# Patient Record
Sex: FEMALE | Race: WHITE | NOT HISPANIC OR LATINO | URBAN - METROPOLITAN AREA
[De-identification: names, ages, dates, MRNs, and addresses within clinical notes are randomized per-mention and may not be internally consistent; named-entity substitution may affect disease eponyms.]

---

## 2017-01-16 ENCOUNTER — COMMUNICATION - HEALTHEAST (OUTPATIENT)
Dept: NEUROLOGY | Facility: CLINIC | Age: 27
End: 2017-01-16

## 2017-01-16 DIAGNOSIS — S06.9XAS MOOD DISORDER AS LATE EFFECT OF TRAUMATIC BRAIN INJURY (H): ICD-10-CM

## 2017-01-16 DIAGNOSIS — F06.30 MOOD DISORDER AS LATE EFFECT OF TRAUMATIC BRAIN INJURY (H): ICD-10-CM

## 2017-02-12 ENCOUNTER — COMMUNICATION - HEALTHEAST (OUTPATIENT)
Dept: NEUROLOGY | Facility: CLINIC | Age: 27
End: 2017-02-12

## 2017-02-12 DIAGNOSIS — F06.30 MOOD DISORDER IN CONDITIONS CLASSIFIED ELSEWHERE: ICD-10-CM

## 2017-02-22 ENCOUNTER — COMMUNICATION - HEALTHEAST (OUTPATIENT)
Dept: NEUROLOGY | Facility: CLINIC | Age: 27
End: 2017-02-22

## 2017-02-27 ENCOUNTER — COMMUNICATION - HEALTHEAST (OUTPATIENT)
Dept: NEUROLOGY | Facility: CLINIC | Age: 27
End: 2017-02-27

## 2017-02-27 DIAGNOSIS — F06.30 MOOD DISORDER IN CONDITIONS CLASSIFIED ELSEWHERE: ICD-10-CM

## 2017-03-06 ENCOUNTER — COMMUNICATION - HEALTHEAST (OUTPATIENT)
Dept: NEUROLOGY | Facility: CLINIC | Age: 27
End: 2017-03-06

## 2017-03-06 DIAGNOSIS — F06.30 MOOD DISORDER AS LATE EFFECT OF TRAUMATIC BRAIN INJURY (H): ICD-10-CM

## 2017-03-06 DIAGNOSIS — S06.9XAS MOOD DISORDER AS LATE EFFECT OF TRAUMATIC BRAIN INJURY (H): ICD-10-CM

## 2017-04-02 ENCOUNTER — COMMUNICATION - HEALTHEAST (OUTPATIENT)
Dept: NEUROLOGY | Facility: CLINIC | Age: 27
End: 2017-04-02

## 2017-04-02 DIAGNOSIS — F06.30 MOOD DISORDER AS LATE EFFECT OF TRAUMATIC BRAIN INJURY (H): ICD-10-CM

## 2017-04-02 DIAGNOSIS — S06.9XAS MOOD DISORDER AS LATE EFFECT OF TRAUMATIC BRAIN INJURY (H): ICD-10-CM

## 2017-04-11 ENCOUNTER — HOSPITAL ENCOUNTER (OUTPATIENT)
Dept: NEUROLOGY | Facility: CLINIC | Age: 27
Setting detail: THERAPIES SERIES
Discharge: STILL ADMITTED OR ADMINISTRATIVE | End: 2017-04-11
Attending: PSYCHIATRY & NEUROLOGY

## 2017-04-11 DIAGNOSIS — S06.9XAS MOOD DISORDER AS LATE EFFECT OF TRAUMATIC BRAIN INJURY (H): ICD-10-CM

## 2017-04-11 DIAGNOSIS — F06.30 MOOD DISORDER AS LATE EFFECT OF TRAUMATIC BRAIN INJURY (H): ICD-10-CM

## 2017-04-11 RX ORDER — VARENICLINE TARTRATE 1 MG/1
1 TABLET, FILM COATED ORAL 2 TIMES DAILY
Status: SHIPPED | COMMUNITY
Start: 2017-04-11

## 2017-04-16 ENCOUNTER — COMMUNICATION - HEALTHEAST (OUTPATIENT)
Dept: NEUROLOGY | Facility: CLINIC | Age: 27
End: 2017-04-16

## 2017-04-16 DIAGNOSIS — F06.30 MOOD DISORDER AS LATE EFFECT OF TRAUMATIC BRAIN INJURY (H): ICD-10-CM

## 2017-04-16 DIAGNOSIS — S06.9XAS MOOD DISORDER AS LATE EFFECT OF TRAUMATIC BRAIN INJURY (H): ICD-10-CM

## 2017-04-23 ENCOUNTER — COMMUNICATION - HEALTHEAST (OUTPATIENT)
Dept: NEUROLOGY | Facility: CLINIC | Age: 27
End: 2017-04-23

## 2017-04-23 DIAGNOSIS — S06.9XAS MOOD DISORDER AS LATE EFFECT OF TRAUMATIC BRAIN INJURY (H): ICD-10-CM

## 2017-04-23 DIAGNOSIS — F06.30 MOOD DISORDER IN CONDITIONS CLASSIFIED ELSEWHERE: ICD-10-CM

## 2017-04-23 DIAGNOSIS — F06.30 MOOD DISORDER AS LATE EFFECT OF TRAUMATIC BRAIN INJURY (H): ICD-10-CM

## 2017-05-07 ENCOUNTER — COMMUNICATION - HEALTHEAST (OUTPATIENT)
Dept: NEUROLOGY | Facility: CLINIC | Age: 27
End: 2017-05-07

## 2017-05-07 DIAGNOSIS — F41.1 ANXIETY STATE: ICD-10-CM

## 2017-05-21 ENCOUNTER — COMMUNICATION - HEALTHEAST (OUTPATIENT)
Dept: NEUROLOGY | Facility: CLINIC | Age: 27
End: 2017-05-21

## 2017-05-21 DIAGNOSIS — F06.30 MOOD DISORDER IN CONDITIONS CLASSIFIED ELSEWHERE: ICD-10-CM

## 2017-05-28 ENCOUNTER — COMMUNICATION - HEALTHEAST (OUTPATIENT)
Dept: NEUROLOGY | Facility: CLINIC | Age: 27
End: 2017-05-28

## 2017-05-28 DIAGNOSIS — S06.9XAS MOOD DISORDER AS LATE EFFECT OF TRAUMATIC BRAIN INJURY (H): ICD-10-CM

## 2017-05-28 DIAGNOSIS — F06.30 MOOD DISORDER AS LATE EFFECT OF TRAUMATIC BRAIN INJURY (H): ICD-10-CM

## 2017-06-25 ENCOUNTER — COMMUNICATION - HEALTHEAST (OUTPATIENT)
Dept: NEUROLOGY | Facility: CLINIC | Age: 27
End: 2017-06-25

## 2017-06-25 DIAGNOSIS — F06.30 MOOD DISORDER IN CONDITIONS CLASSIFIED ELSEWHERE: ICD-10-CM

## 2017-07-09 ENCOUNTER — COMMUNICATION - HEALTHEAST (OUTPATIENT)
Dept: NEUROLOGY | Facility: CLINIC | Age: 27
End: 2017-07-09

## 2017-07-09 DIAGNOSIS — S06.9XAS MOOD DISORDER AS LATE EFFECT OF TRAUMATIC BRAIN INJURY (H): ICD-10-CM

## 2017-07-09 DIAGNOSIS — F06.30 MOOD DISORDER IN CONDITIONS CLASSIFIED ELSEWHERE: ICD-10-CM

## 2017-07-09 DIAGNOSIS — F06.30 MOOD DISORDER AS LATE EFFECT OF TRAUMATIC BRAIN INJURY (H): ICD-10-CM

## 2017-07-11 ENCOUNTER — HOSPITAL ENCOUNTER (OUTPATIENT)
Dept: NEUROLOGY | Facility: CLINIC | Age: 27
Setting detail: THERAPIES SERIES
Discharge: STILL ADMITTED OR ADMINISTRATIVE | End: 2017-07-11
Attending: PSYCHIATRY & NEUROLOGY

## 2017-07-11 DIAGNOSIS — F06.30 MOOD DISORDER AS LATE EFFECT OF TRAUMATIC BRAIN INJURY (H): ICD-10-CM

## 2017-07-11 DIAGNOSIS — S06.9XAS MOOD DISORDER AS LATE EFFECT OF TRAUMATIC BRAIN INJURY (H): ICD-10-CM

## 2017-07-23 ENCOUNTER — COMMUNICATION - HEALTHEAST (OUTPATIENT)
Dept: NEUROLOGY | Facility: CLINIC | Age: 27
End: 2017-07-23

## 2017-07-23 DIAGNOSIS — F06.30 MOOD DISORDER IN CONDITIONS CLASSIFIED ELSEWHERE: ICD-10-CM

## 2017-08-06 ENCOUNTER — COMMUNICATION - HEALTHEAST (OUTPATIENT)
Dept: NEUROLOGY | Facility: CLINIC | Age: 27
End: 2017-08-06

## 2017-08-06 DIAGNOSIS — F06.30 MOOD DISORDER AS LATE EFFECT OF TRAUMATIC BRAIN INJURY (H): ICD-10-CM

## 2017-08-06 DIAGNOSIS — S06.9XAS MOOD DISORDER AS LATE EFFECT OF TRAUMATIC BRAIN INJURY (H): ICD-10-CM

## 2017-08-17 ENCOUNTER — COMMUNICATION - HEALTHEAST (OUTPATIENT)
Dept: NEUROLOGY | Facility: CLINIC | Age: 27
End: 2017-08-17

## 2017-08-17 DIAGNOSIS — F06.30 MOOD DISORDER AS LATE EFFECT OF TRAUMATIC BRAIN INJURY (H): ICD-10-CM

## 2017-08-17 DIAGNOSIS — S06.9XAS MOOD DISORDER AS LATE EFFECT OF TRAUMATIC BRAIN INJURY (H): ICD-10-CM

## 2017-08-27 ENCOUNTER — COMMUNICATION - HEALTHEAST (OUTPATIENT)
Dept: NEUROLOGY | Facility: CLINIC | Age: 27
End: 2017-08-27

## 2017-08-27 DIAGNOSIS — F06.30 MOOD DISORDER AS LATE EFFECT OF TRAUMATIC BRAIN INJURY (H): ICD-10-CM

## 2017-08-27 DIAGNOSIS — F06.30 MOOD DISORDER IN CONDITIONS CLASSIFIED ELSEWHERE: ICD-10-CM

## 2017-08-27 DIAGNOSIS — S06.9XAS MOOD DISORDER AS LATE EFFECT OF TRAUMATIC BRAIN INJURY (H): ICD-10-CM

## 2017-09-24 ENCOUNTER — COMMUNICATION - HEALTHEAST (OUTPATIENT)
Dept: NEUROLOGY | Facility: CLINIC | Age: 27
End: 2017-09-24

## 2017-09-24 DIAGNOSIS — F06.30 MOOD DISORDER IN CONDITIONS CLASSIFIED ELSEWHERE: ICD-10-CM

## 2017-10-10 ENCOUNTER — HOSPITAL ENCOUNTER (OUTPATIENT)
Dept: NEUROLOGY | Facility: CLINIC | Age: 27
Setting detail: THERAPIES SERIES
Discharge: STILL ADMITTED OR ADMINISTRATIVE | End: 2017-10-10
Attending: PSYCHIATRY & NEUROLOGY

## 2017-10-10 DIAGNOSIS — F06.30 MOOD DISORDER AS LATE EFFECT OF TRAUMATIC BRAIN INJURY (H): ICD-10-CM

## 2017-10-10 DIAGNOSIS — S06.9XAS MOOD DISORDER AS LATE EFFECT OF TRAUMATIC BRAIN INJURY (H): ICD-10-CM

## 2017-10-13 ENCOUNTER — COMMUNICATION - HEALTHEAST (OUTPATIENT)
Dept: NEUROLOGY | Facility: CLINIC | Age: 27
End: 2017-10-13

## 2017-10-26 ENCOUNTER — COMMUNICATION - HEALTHEAST (OUTPATIENT)
Dept: NEUROLOGY | Facility: CLINIC | Age: 27
End: 2017-10-26

## 2017-10-26 DIAGNOSIS — F06.30 MOOD DISORDER AS LATE EFFECT OF TRAUMATIC BRAIN INJURY (H): ICD-10-CM

## 2017-10-26 DIAGNOSIS — S06.9XAS MOOD DISORDER AS LATE EFFECT OF TRAUMATIC BRAIN INJURY (H): ICD-10-CM

## 2017-10-29 ENCOUNTER — COMMUNICATION - HEALTHEAST (OUTPATIENT)
Dept: NEUROLOGY | Facility: CLINIC | Age: 27
End: 2017-10-29

## 2017-10-29 DIAGNOSIS — F06.30 MOOD DISORDER IN CONDITIONS CLASSIFIED ELSEWHERE: ICD-10-CM

## 2017-11-05 ENCOUNTER — COMMUNICATION - HEALTHEAST (OUTPATIENT)
Dept: NEUROLOGY | Facility: CLINIC | Age: 27
End: 2017-11-05

## 2017-11-05 DIAGNOSIS — S06.9XAS MOOD DISORDER AS LATE EFFECT OF TRAUMATIC BRAIN INJURY (H): ICD-10-CM

## 2017-11-05 DIAGNOSIS — F06.30 MOOD DISORDER AS LATE EFFECT OF TRAUMATIC BRAIN INJURY (H): ICD-10-CM

## 2017-11-05 DIAGNOSIS — F06.30 MOOD DISORDER IN CONDITIONS CLASSIFIED ELSEWHERE: ICD-10-CM

## 2017-11-16 ENCOUNTER — HOSPITAL ENCOUNTER (OUTPATIENT)
Dept: NEUROLOGY | Facility: CLINIC | Age: 27
Setting detail: THERAPIES SERIES
Discharge: STILL ADMITTED OR ADMINISTRATIVE | End: 2017-11-16
Attending: PSYCHIATRY & NEUROLOGY

## 2017-11-16 DIAGNOSIS — F06.30 MOOD DISORDER IN CONDITIONS CLASSIFIED ELSEWHERE: ICD-10-CM

## 2017-11-26 ENCOUNTER — COMMUNICATION - HEALTHEAST (OUTPATIENT)
Dept: NEUROLOGY | Facility: CLINIC | Age: 27
End: 2017-11-26

## 2017-11-26 DIAGNOSIS — F06.30 MOOD DISORDER IN CONDITIONS CLASSIFIED ELSEWHERE: ICD-10-CM

## 2017-12-01 ENCOUNTER — COMMUNICATION - HEALTHEAST (OUTPATIENT)
Dept: NEUROLOGY | Facility: CLINIC | Age: 27
End: 2017-12-01

## 2017-12-01 DIAGNOSIS — F06.30 MOOD DISORDER AS LATE EFFECT OF TRAUMATIC BRAIN INJURY (H): ICD-10-CM

## 2017-12-01 DIAGNOSIS — F41.1 ANXIETY STATE: ICD-10-CM

## 2017-12-01 DIAGNOSIS — F06.30 MOOD DISORDER IN CONDITIONS CLASSIFIED ELSEWHERE: ICD-10-CM

## 2017-12-01 DIAGNOSIS — S06.9XAS MOOD DISORDER AS LATE EFFECT OF TRAUMATIC BRAIN INJURY (H): ICD-10-CM

## 2017-12-03 ENCOUNTER — COMMUNICATION - HEALTHEAST (OUTPATIENT)
Dept: NEUROLOGY | Facility: CLINIC | Age: 27
End: 2017-12-03

## 2017-12-03 DIAGNOSIS — S06.9XAS MOOD DISORDER AS LATE EFFECT OF TRAUMATIC BRAIN INJURY (H): ICD-10-CM

## 2017-12-03 DIAGNOSIS — F06.30 MOOD DISORDER AS LATE EFFECT OF TRAUMATIC BRAIN INJURY (H): ICD-10-CM

## 2018-01-01 ENCOUNTER — COMMUNICATION - HEALTHEAST (OUTPATIENT)
Dept: NEUROLOGY | Facility: CLINIC | Age: 28
End: 2018-01-01

## 2018-01-01 DIAGNOSIS — F06.30 MOOD DISORDER AS LATE EFFECT OF TRAUMATIC BRAIN INJURY (H): ICD-10-CM

## 2018-01-01 DIAGNOSIS — S06.9XAS MOOD DISORDER AS LATE EFFECT OF TRAUMATIC BRAIN INJURY (H): ICD-10-CM

## 2018-01-01 DIAGNOSIS — F06.30 MOOD DISORDER IN CONDITIONS CLASSIFIED ELSEWHERE: ICD-10-CM

## 2018-01-04 ENCOUNTER — COMMUNICATION - HEALTHEAST (OUTPATIENT)
Dept: NEUROLOGY | Facility: CLINIC | Age: 28
End: 2018-01-04

## 2018-01-04 DIAGNOSIS — S06.9XAS MOOD DISORDER AS LATE EFFECT OF TRAUMATIC BRAIN INJURY (H): ICD-10-CM

## 2018-01-04 DIAGNOSIS — F06.30 MOOD DISORDER AS LATE EFFECT OF TRAUMATIC BRAIN INJURY (H): ICD-10-CM

## 2018-01-05 ENCOUNTER — COMMUNICATION - HEALTHEAST (OUTPATIENT)
Dept: NEUROLOGY | Facility: CLINIC | Age: 28
End: 2018-01-05

## 2018-01-14 ENCOUNTER — COMMUNICATION - HEALTHEAST (OUTPATIENT)
Dept: NEUROLOGY | Facility: CLINIC | Age: 28
End: 2018-01-14

## 2018-01-14 DIAGNOSIS — F06.30 MOOD DISORDER IN CONDITIONS CLASSIFIED ELSEWHERE: ICD-10-CM

## 2018-01-28 ENCOUNTER — COMMUNICATION - HEALTHEAST (OUTPATIENT)
Dept: NEUROLOGY | Facility: CLINIC | Age: 28
End: 2018-01-28

## 2018-01-28 DIAGNOSIS — F06.30 MOOD DISORDER IN CONDITIONS CLASSIFIED ELSEWHERE: ICD-10-CM

## 2018-02-06 ENCOUNTER — HOSPITAL ENCOUNTER (OUTPATIENT)
Dept: NEUROLOGY | Facility: CLINIC | Age: 28
Setting detail: THERAPIES SERIES
Discharge: STILL ADMITTED OR ADMINISTRATIVE | End: 2018-02-06
Attending: PSYCHIATRY & NEUROLOGY

## 2018-02-06 DIAGNOSIS — F06.30 MOOD DISORDER IN CONDITIONS CLASSIFIED ELSEWHERE: ICD-10-CM

## 2018-02-25 ENCOUNTER — COMMUNICATION - HEALTHEAST (OUTPATIENT)
Dept: NEUROLOGY | Facility: CLINIC | Age: 28
End: 2018-02-25

## 2018-02-25 DIAGNOSIS — F06.30 MOOD DISORDER IN CONDITIONS CLASSIFIED ELSEWHERE: ICD-10-CM

## 2018-02-25 DIAGNOSIS — F06.30 MOOD DISORDER AS LATE EFFECT OF TRAUMATIC BRAIN INJURY (H): ICD-10-CM

## 2018-02-25 DIAGNOSIS — S06.9XAS MOOD DISORDER AS LATE EFFECT OF TRAUMATIC BRAIN INJURY (H): ICD-10-CM

## 2018-03-04 ENCOUNTER — COMMUNICATION - HEALTHEAST (OUTPATIENT)
Dept: NEUROLOGY | Facility: CLINIC | Age: 28
End: 2018-03-04

## 2018-03-04 DIAGNOSIS — F06.30 MOOD DISORDER AS LATE EFFECT OF TRAUMATIC BRAIN INJURY (H): ICD-10-CM

## 2018-03-04 DIAGNOSIS — S06.9XAS MOOD DISORDER AS LATE EFFECT OF TRAUMATIC BRAIN INJURY (H): ICD-10-CM

## 2018-03-05 ENCOUNTER — COMMUNICATION - HEALTHEAST (OUTPATIENT)
Dept: NEUROLOGY | Facility: CLINIC | Age: 28
End: 2018-03-05

## 2018-03-22 ENCOUNTER — COMMUNICATION - HEALTHEAST (OUTPATIENT)
Dept: NEUROLOGY | Facility: CLINIC | Age: 28
End: 2018-03-22

## 2018-03-27 ENCOUNTER — HOSPITAL ENCOUNTER (OUTPATIENT)
Dept: NEUROLOGY | Facility: CLINIC | Age: 28
Setting detail: THERAPIES SERIES
Discharge: STILL ADMITTED OR ADMINISTRATIVE | End: 2018-03-27
Attending: PSYCHIATRY & NEUROLOGY

## 2018-03-27 DIAGNOSIS — F41.1 ANXIETY STATE: ICD-10-CM

## 2018-03-27 DIAGNOSIS — F06.30 MOOD DISORDER IN CONDITIONS CLASSIFIED ELSEWHERE: ICD-10-CM

## 2018-03-27 DIAGNOSIS — S06.9XAS MOOD DISORDER AS LATE EFFECT OF TRAUMATIC BRAIN INJURY (H): ICD-10-CM

## 2018-03-27 DIAGNOSIS — F06.30 MOOD DISORDER AS LATE EFFECT OF TRAUMATIC BRAIN INJURY (H): ICD-10-CM

## 2018-05-06 ENCOUNTER — COMMUNICATION - HEALTHEAST (OUTPATIENT)
Dept: NEUROLOGY | Facility: CLINIC | Age: 28
End: 2018-05-06

## 2018-05-06 DIAGNOSIS — S06.9XAS MOOD DISORDER AS LATE EFFECT OF TRAUMATIC BRAIN INJURY (H): ICD-10-CM

## 2018-05-06 DIAGNOSIS — F06.30 MOOD DISORDER AS LATE EFFECT OF TRAUMATIC BRAIN INJURY (H): ICD-10-CM

## 2018-05-07 ENCOUNTER — COMMUNICATION - HEALTHEAST (OUTPATIENT)
Dept: NEUROLOGY | Facility: CLINIC | Age: 28
End: 2018-05-07

## 2018-05-15 ENCOUNTER — HOSPITAL ENCOUNTER (OUTPATIENT)
Dept: NEUROLOGY | Facility: CLINIC | Age: 28
Setting detail: THERAPIES SERIES
Discharge: STILL ADMITTED OR ADMINISTRATIVE | End: 2018-05-15
Attending: PSYCHIATRY & NEUROLOGY

## 2018-05-15 DIAGNOSIS — F06.30 MOOD DISORDER IN CONDITIONS CLASSIFIED ELSEWHERE: ICD-10-CM

## 2018-07-08 ENCOUNTER — COMMUNICATION - HEALTHEAST (OUTPATIENT)
Dept: NEUROLOGY | Facility: CLINIC | Age: 28
End: 2018-07-08

## 2018-07-08 DIAGNOSIS — F06.30 MOOD DISORDER AS LATE EFFECT OF TRAUMATIC BRAIN INJURY (H): ICD-10-CM

## 2018-07-08 DIAGNOSIS — S06.9XAS MOOD DISORDER AS LATE EFFECT OF TRAUMATIC BRAIN INJURY (H): ICD-10-CM

## 2018-07-29 ENCOUNTER — COMMUNICATION - HEALTHEAST (OUTPATIENT)
Dept: NEUROLOGY | Facility: CLINIC | Age: 28
End: 2018-07-29

## 2018-07-29 DIAGNOSIS — F06.30 MOOD DISORDER AS LATE EFFECT OF TRAUMATIC BRAIN INJURY (H): ICD-10-CM

## 2018-07-29 DIAGNOSIS — S06.9XAS MOOD DISORDER AS LATE EFFECT OF TRAUMATIC BRAIN INJURY (H): ICD-10-CM

## 2018-08-05 ENCOUNTER — COMMUNICATION - HEALTHEAST (OUTPATIENT)
Dept: NEUROLOGY | Facility: CLINIC | Age: 28
End: 2018-08-05

## 2018-08-05 DIAGNOSIS — F06.30 MOOD DISORDER IN CONDITIONS CLASSIFIED ELSEWHERE: ICD-10-CM

## 2018-08-14 ENCOUNTER — HOSPITAL ENCOUNTER (OUTPATIENT)
Dept: NEUROLOGY | Facility: CLINIC | Age: 28
Setting detail: THERAPIES SERIES
Discharge: STILL ADMITTED OR ADMINISTRATIVE | End: 2018-08-14
Attending: PSYCHIATRY & NEUROLOGY

## 2018-08-14 DIAGNOSIS — F06.30 MOOD DISORDER AS LATE EFFECT OF TRAUMATIC BRAIN INJURY (H): ICD-10-CM

## 2018-08-14 DIAGNOSIS — S06.9XAS MOOD DISORDER AS LATE EFFECT OF TRAUMATIC BRAIN INJURY (H): ICD-10-CM

## 2018-08-19 ENCOUNTER — COMMUNICATION - HEALTHEAST (OUTPATIENT)
Dept: NEUROLOGY | Facility: CLINIC | Age: 28
End: 2018-08-19

## 2018-08-19 DIAGNOSIS — S06.9XAS MOOD DISORDER AS LATE EFFECT OF TRAUMATIC BRAIN INJURY (H): ICD-10-CM

## 2018-08-19 DIAGNOSIS — F06.30 MOOD DISORDER AS LATE EFFECT OF TRAUMATIC BRAIN INJURY (H): ICD-10-CM

## 2018-09-09 ENCOUNTER — COMMUNICATION - HEALTHEAST (OUTPATIENT)
Dept: NEUROLOGY | Facility: CLINIC | Age: 28
End: 2018-09-09

## 2018-09-09 DIAGNOSIS — F06.30 MOOD DISORDER IN CONDITIONS CLASSIFIED ELSEWHERE: ICD-10-CM

## 2018-09-11 ENCOUNTER — COMMUNICATION - HEALTHEAST (OUTPATIENT)
Dept: NEUROLOGY | Facility: CLINIC | Age: 28
End: 2018-09-11

## 2018-09-11 DIAGNOSIS — F06.30 MOOD DISORDER AS LATE EFFECT OF TRAUMATIC BRAIN INJURY (H): ICD-10-CM

## 2018-09-11 DIAGNOSIS — S06.9XAS MOOD DISORDER AS LATE EFFECT OF TRAUMATIC BRAIN INJURY (H): ICD-10-CM

## 2018-09-27 ENCOUNTER — COMMUNICATION - HEALTHEAST (OUTPATIENT)
Dept: NEUROLOGY | Facility: CLINIC | Age: 28
End: 2018-09-27

## 2018-10-02 ENCOUNTER — COMMUNICATION - HEALTHEAST (OUTPATIENT)
Dept: NEUROLOGY | Facility: CLINIC | Age: 28
End: 2018-10-02

## 2018-10-07 ENCOUNTER — COMMUNICATION - HEALTHEAST (OUTPATIENT)
Dept: NEUROLOGY | Facility: CLINIC | Age: 28
End: 2018-10-07

## 2018-10-07 DIAGNOSIS — F06.30 MOOD DISORDER IN CONDITIONS CLASSIFIED ELSEWHERE: ICD-10-CM

## 2018-10-11 ENCOUNTER — COMMUNICATION - HEALTHEAST (OUTPATIENT)
Dept: NEUROLOGY | Facility: CLINIC | Age: 28
End: 2018-10-11

## 2018-10-11 DIAGNOSIS — F06.30 MOOD DISORDER IN CONDITIONS CLASSIFIED ELSEWHERE: ICD-10-CM

## 2018-11-13 ENCOUNTER — COMMUNICATION - HEALTHEAST (OUTPATIENT)
Dept: NEUROLOGY | Facility: CLINIC | Age: 28
End: 2018-11-13

## 2018-11-13 DIAGNOSIS — F06.30 MOOD DISORDER AS LATE EFFECT OF TRAUMATIC BRAIN INJURY (H): ICD-10-CM

## 2018-11-13 DIAGNOSIS — S06.9XAS MOOD DISORDER AS LATE EFFECT OF TRAUMATIC BRAIN INJURY (H): ICD-10-CM

## 2018-11-15 ENCOUNTER — COMMUNICATION - HEALTHEAST (OUTPATIENT)
Dept: NEUROLOGY | Facility: CLINIC | Age: 28
End: 2018-11-15

## 2018-11-15 DIAGNOSIS — F06.30 MOOD DISORDER AS LATE EFFECT OF TRAUMATIC BRAIN INJURY (H): ICD-10-CM

## 2018-11-15 DIAGNOSIS — S06.9XAS MOOD DISORDER AS LATE EFFECT OF TRAUMATIC BRAIN INJURY (H): ICD-10-CM

## 2018-11-25 ENCOUNTER — COMMUNICATION - HEALTHEAST (OUTPATIENT)
Dept: NEUROLOGY | Facility: CLINIC | Age: 28
End: 2018-11-25

## 2018-11-25 DIAGNOSIS — F06.30 MOOD DISORDER AS LATE EFFECT OF TRAUMATIC BRAIN INJURY (H): ICD-10-CM

## 2018-11-25 DIAGNOSIS — S06.9XAS MOOD DISORDER AS LATE EFFECT OF TRAUMATIC BRAIN INJURY (H): ICD-10-CM

## 2018-12-17 ENCOUNTER — COMMUNICATION - HEALTHEAST (OUTPATIENT)
Dept: NEUROLOGY | Facility: CLINIC | Age: 28
End: 2018-12-17

## 2018-12-17 DIAGNOSIS — F06.30 MOOD DISORDER AS LATE EFFECT OF TRAUMATIC BRAIN INJURY (H): ICD-10-CM

## 2018-12-17 DIAGNOSIS — S06.9XAS MOOD DISORDER AS LATE EFFECT OF TRAUMATIC BRAIN INJURY (H): ICD-10-CM

## 2018-12-23 ENCOUNTER — COMMUNICATION - HEALTHEAST (OUTPATIENT)
Dept: NEUROLOGY | Facility: CLINIC | Age: 28
End: 2018-12-23

## 2018-12-23 DIAGNOSIS — F06.30 MOOD DISORDER AS LATE EFFECT OF TRAUMATIC BRAIN INJURY (H): ICD-10-CM

## 2018-12-23 DIAGNOSIS — S06.9XAS MOOD DISORDER AS LATE EFFECT OF TRAUMATIC BRAIN INJURY (H): ICD-10-CM

## 2018-12-27 ENCOUNTER — HOSPITAL ENCOUNTER (OUTPATIENT)
Dept: NEUROLOGY | Facility: CLINIC | Age: 28
Setting detail: THERAPIES SERIES
Discharge: STILL ADMITTED OR ADMINISTRATIVE | End: 2018-12-27
Attending: PSYCHIATRY & NEUROLOGY

## 2018-12-27 DIAGNOSIS — F06.30 MOOD DISORDER IN CONDITIONS CLASSIFIED ELSEWHERE: ICD-10-CM

## 2019-02-19 ENCOUNTER — COMMUNICATION - HEALTHEAST (OUTPATIENT)
Dept: NEUROLOGY | Facility: CLINIC | Age: 29
End: 2019-02-19

## 2019-02-19 DIAGNOSIS — S06.9XAS MOOD DISORDER AS LATE EFFECT OF TRAUMATIC BRAIN INJURY (H): ICD-10-CM

## 2019-02-19 DIAGNOSIS — F06.30 MOOD DISORDER AS LATE EFFECT OF TRAUMATIC BRAIN INJURY (H): ICD-10-CM

## 2019-02-24 ENCOUNTER — COMMUNICATION - HEALTHEAST (OUTPATIENT)
Dept: NEUROLOGY | Facility: CLINIC | Age: 29
End: 2019-02-24

## 2019-02-24 DIAGNOSIS — F06.30 MOOD DISORDER IN CONDITIONS CLASSIFIED ELSEWHERE: ICD-10-CM

## 2019-03-10 ENCOUNTER — COMMUNICATION - HEALTHEAST (OUTPATIENT)
Dept: NEUROLOGY | Facility: CLINIC | Age: 29
End: 2019-03-10

## 2019-03-10 DIAGNOSIS — S06.9XAS MOOD DISORDER AS LATE EFFECT OF TRAUMATIC BRAIN INJURY (H): ICD-10-CM

## 2019-03-10 DIAGNOSIS — F06.30 MOOD DISORDER AS LATE EFFECT OF TRAUMATIC BRAIN INJURY (H): ICD-10-CM

## 2019-03-17 ENCOUNTER — COMMUNICATION - HEALTHEAST (OUTPATIENT)
Dept: NEUROLOGY | Facility: CLINIC | Age: 29
End: 2019-03-17

## 2019-03-17 DIAGNOSIS — S06.9XAS MOOD DISORDER AS LATE EFFECT OF TRAUMATIC BRAIN INJURY (H): ICD-10-CM

## 2019-03-17 DIAGNOSIS — F06.30 MOOD DISORDER AS LATE EFFECT OF TRAUMATIC BRAIN INJURY (H): ICD-10-CM

## 2019-03-18 ENCOUNTER — COMMUNICATION - HEALTHEAST (OUTPATIENT)
Dept: NEUROLOGY | Facility: CLINIC | Age: 29
End: 2019-03-18

## 2019-03-18 DIAGNOSIS — S06.9XAS MOOD DISORDER AS LATE EFFECT OF TRAUMATIC BRAIN INJURY (H): ICD-10-CM

## 2019-03-18 DIAGNOSIS — F06.30 MOOD DISORDER AS LATE EFFECT OF TRAUMATIC BRAIN INJURY (H): ICD-10-CM

## 2019-04-02 ENCOUNTER — COMMUNICATION - HEALTHEAST (OUTPATIENT)
Dept: NEUROLOGY | Facility: CLINIC | Age: 29
End: 2019-04-02

## 2019-04-25 ENCOUNTER — HOSPITAL ENCOUNTER (OUTPATIENT)
Dept: NEUROLOGY | Facility: CLINIC | Age: 29
Setting detail: THERAPIES SERIES
Discharge: STILL ADMITTED OR ADMINISTRATIVE | End: 2019-04-25
Attending: PSYCHIATRY & NEUROLOGY

## 2019-04-25 DIAGNOSIS — F41.1 ANXIETY STATE: ICD-10-CM

## 2019-04-25 DIAGNOSIS — S06.9XAS MOOD DISORDER AS LATE EFFECT OF TRAUMATIC BRAIN INJURY (H): ICD-10-CM

## 2019-04-25 DIAGNOSIS — F06.30 MOOD DISORDER AS LATE EFFECT OF TRAUMATIC BRAIN INJURY (H): ICD-10-CM

## 2019-04-25 RX ORDER — CYCLOBENZAPRINE HCL 5 MG
TABLET ORAL
Status: SHIPPED | COMMUNITY
Start: 2018-06-22

## 2019-04-25 RX ORDER — NAPROXEN SODIUM 220 MG/1
TABLET ORAL
Status: SHIPPED | COMMUNITY
Start: 2019-01-21

## 2019-04-25 RX ORDER — METHOCARBAMOL 750 MG/1
TABLET, FILM COATED ORAL
Status: SHIPPED | COMMUNITY
Start: 2018-05-01

## 2019-04-25 RX ORDER — FERROUS SULFATE 325(65) MG
TABLET ORAL
Status: SHIPPED | COMMUNITY
Start: 2019-02-17

## 2019-04-25 RX ORDER — LORAZEPAM 0.5 MG/1
0.5 TABLET ORAL
Status: SHIPPED | COMMUNITY
Start: 2012-02-02

## 2019-04-25 RX ORDER — MAGNESIUM OXIDE 400 MG/1
TABLET ORAL
Status: SHIPPED | COMMUNITY
Start: 2018-12-10

## 2019-06-13 ENCOUNTER — HOSPITAL ENCOUNTER (OUTPATIENT)
Dept: NEUROLOGY | Facility: CLINIC | Age: 29
Setting detail: THERAPIES SERIES
Discharge: STILL ADMITTED OR ADMINISTRATIVE | End: 2019-06-13
Attending: PSYCHIATRY & NEUROLOGY

## 2019-06-13 ENCOUNTER — COMMUNICATION - HEALTHEAST (OUTPATIENT)
Dept: NEUROLOGY | Facility: CLINIC | Age: 29
End: 2019-06-13

## 2019-06-13 DIAGNOSIS — F06.30 MOOD DISORDER AS LATE EFFECT OF TRAUMATIC BRAIN INJURY (H): ICD-10-CM

## 2019-06-13 DIAGNOSIS — S06.9XAS MOOD DISORDER AS LATE EFFECT OF TRAUMATIC BRAIN INJURY (H): ICD-10-CM

## 2019-07-07 ENCOUNTER — COMMUNICATION - HEALTHEAST (OUTPATIENT)
Dept: NEUROLOGY | Facility: CLINIC | Age: 29
End: 2019-07-07

## 2019-07-07 DIAGNOSIS — S06.9XAS MOOD DISORDER AS LATE EFFECT OF TRAUMATIC BRAIN INJURY (H): ICD-10-CM

## 2019-07-07 DIAGNOSIS — F06.30 MOOD DISORDER IN CONDITIONS CLASSIFIED ELSEWHERE: ICD-10-CM

## 2019-07-07 DIAGNOSIS — F06.30 MOOD DISORDER AS LATE EFFECT OF TRAUMATIC BRAIN INJURY (H): ICD-10-CM

## 2019-07-08 RX ORDER — TRAZODONE HYDROCHLORIDE 100 MG/1
TABLET ORAL
Qty: 60 TABLET | Refills: 3 | Status: SHIPPED | OUTPATIENT
Start: 2019-07-08

## 2019-07-21 ENCOUNTER — COMMUNICATION - HEALTHEAST (OUTPATIENT)
Dept: NEUROLOGY | Facility: CLINIC | Age: 29
End: 2019-07-21

## 2019-07-21 DIAGNOSIS — S06.9XAS MOOD DISORDER AS LATE EFFECT OF TRAUMATIC BRAIN INJURY (H): ICD-10-CM

## 2019-07-21 DIAGNOSIS — F06.30 MOOD DISORDER AS LATE EFFECT OF TRAUMATIC BRAIN INJURY (H): ICD-10-CM

## 2019-08-04 ENCOUNTER — COMMUNICATION - HEALTHEAST (OUTPATIENT)
Dept: NEUROLOGY | Facility: CLINIC | Age: 29
End: 2019-08-04

## 2019-08-04 DIAGNOSIS — F06.30 MOOD DISORDER AS LATE EFFECT OF TRAUMATIC BRAIN INJURY (H): ICD-10-CM

## 2019-08-04 DIAGNOSIS — F41.1 ANXIETY STATE: ICD-10-CM

## 2019-08-04 DIAGNOSIS — S06.9XAS MOOD DISORDER AS LATE EFFECT OF TRAUMATIC BRAIN INJURY (H): ICD-10-CM

## 2019-08-05 ENCOUNTER — COMMUNICATION - HEALTHEAST (OUTPATIENT)
Dept: NEUROLOGY | Facility: CLINIC | Age: 29
End: 2019-08-05

## 2019-08-05 DIAGNOSIS — F41.1 ANXIETY STATE: ICD-10-CM

## 2019-08-11 ENCOUNTER — COMMUNICATION - HEALTHEAST (OUTPATIENT)
Dept: NEUROLOGY | Facility: CLINIC | Age: 29
End: 2019-08-11

## 2019-08-11 DIAGNOSIS — S06.9XAS MOOD DISORDER AS LATE EFFECT OF TRAUMATIC BRAIN INJURY (H): ICD-10-CM

## 2019-08-11 DIAGNOSIS — F06.30 MOOD DISORDER AS LATE EFFECT OF TRAUMATIC BRAIN INJURY (H): ICD-10-CM

## 2019-08-25 ENCOUNTER — COMMUNICATION - HEALTHEAST (OUTPATIENT)
Dept: NEUROLOGY | Facility: CLINIC | Age: 29
End: 2019-08-25

## 2019-08-25 DIAGNOSIS — S06.9XAS MOOD DISORDER AS LATE EFFECT OF TRAUMATIC BRAIN INJURY (H): ICD-10-CM

## 2019-08-25 DIAGNOSIS — F06.30 MOOD DISORDER AS LATE EFFECT OF TRAUMATIC BRAIN INJURY (H): ICD-10-CM

## 2019-09-04 ENCOUNTER — COMMUNICATION - HEALTHEAST (OUTPATIENT)
Dept: NEUROLOGY | Facility: CLINIC | Age: 29
End: 2019-09-04

## 2019-09-04 DIAGNOSIS — F06.30 MOOD DISORDER AS LATE EFFECT OF TRAUMATIC BRAIN INJURY (H): ICD-10-CM

## 2019-09-04 DIAGNOSIS — S06.9XAS MOOD DISORDER AS LATE EFFECT OF TRAUMATIC BRAIN INJURY (H): ICD-10-CM

## 2019-09-04 DIAGNOSIS — F06.30 MOOD DISORDER IN CONDITIONS CLASSIFIED ELSEWHERE: ICD-10-CM

## 2019-09-04 DIAGNOSIS — F41.1 ANXIETY STATE: ICD-10-CM

## 2019-09-04 RX ORDER — VENLAFAXINE HYDROCHLORIDE 37.5 MG/1
75 CAPSULE, EXTENDED RELEASE ORAL DAILY
Qty: 60 CAPSULE | Refills: 3 | Status: SHIPPED | OUTPATIENT
Start: 2019-09-04

## 2019-09-06 ENCOUNTER — HOSPITAL ENCOUNTER (OUTPATIENT)
Dept: NEUROLOGY | Facility: CLINIC | Age: 29
Setting detail: THERAPIES SERIES
Discharge: STILL ADMITTED OR ADMINISTRATIVE | End: 2019-09-06
Attending: PSYCHIATRY & NEUROLOGY

## 2019-09-15 ENCOUNTER — COMMUNICATION - HEALTHEAST (OUTPATIENT)
Dept: NEUROLOGY | Facility: CLINIC | Age: 29
End: 2019-09-15

## 2019-09-15 DIAGNOSIS — F06.30 MOOD DISORDER AS LATE EFFECT OF TRAUMATIC BRAIN INJURY (H): ICD-10-CM

## 2019-09-15 DIAGNOSIS — S06.9XAS MOOD DISORDER AS LATE EFFECT OF TRAUMATIC BRAIN INJURY (H): ICD-10-CM

## 2019-09-17 ENCOUNTER — HOSPITAL ENCOUNTER (OUTPATIENT)
Dept: NEUROLOGY | Facility: CLINIC | Age: 29
Setting detail: THERAPIES SERIES
Discharge: STILL ADMITTED OR ADMINISTRATIVE | End: 2019-09-17
Attending: PSYCHIATRY & NEUROLOGY

## 2019-09-17 DIAGNOSIS — S06.9XAS MOOD DISORDER AS LATE EFFECT OF TRAUMATIC BRAIN INJURY (H): ICD-10-CM

## 2019-09-17 DIAGNOSIS — F06.30 MOOD DISORDER AS LATE EFFECT OF TRAUMATIC BRAIN INJURY (H): ICD-10-CM

## 2019-09-20 ENCOUNTER — COMMUNICATION - HEALTHEAST (OUTPATIENT)
Dept: NEUROLOGY | Facility: CLINIC | Age: 29
End: 2019-09-20

## 2019-10-07 ENCOUNTER — COMMUNICATION - HEALTHEAST (OUTPATIENT)
Dept: NEUROLOGY | Facility: CLINIC | Age: 29
End: 2019-10-07

## 2019-10-14 ENCOUNTER — COMMUNICATION - HEALTHEAST (OUTPATIENT)
Dept: NEUROLOGY | Facility: CLINIC | Age: 29
End: 2019-10-14

## 2019-10-14 DIAGNOSIS — F06.30 MOOD DISORDER AS LATE EFFECT OF TRAUMATIC BRAIN INJURY (H): ICD-10-CM

## 2019-10-14 DIAGNOSIS — S06.9XAS MOOD DISORDER AS LATE EFFECT OF TRAUMATIC BRAIN INJURY (H): ICD-10-CM

## 2019-10-18 ENCOUNTER — COMMUNICATION - HEALTHEAST (OUTPATIENT)
Dept: NEUROLOGY | Facility: CLINIC | Age: 29
End: 2019-10-18

## 2019-10-20 ENCOUNTER — COMMUNICATION - HEALTHEAST (OUTPATIENT)
Dept: NEUROLOGY | Facility: CLINIC | Age: 29
End: 2019-10-20

## 2019-10-20 DIAGNOSIS — F41.1 ANXIETY STATE: ICD-10-CM

## 2019-10-22 ENCOUNTER — HOSPITAL ENCOUNTER (OUTPATIENT)
Dept: NEUROLOGY | Facility: CLINIC | Age: 29
Setting detail: THERAPIES SERIES
Discharge: STILL ADMITTED OR ADMINISTRATIVE | End: 2019-10-22
Attending: PSYCHIATRY & NEUROLOGY

## 2019-10-22 DIAGNOSIS — S06.9XAS MOOD DISORDER AS LATE EFFECT OF TRAUMATIC BRAIN INJURY (H): ICD-10-CM

## 2019-10-22 DIAGNOSIS — F06.30 MOOD DISORDER AS LATE EFFECT OF TRAUMATIC BRAIN INJURY (H): ICD-10-CM

## 2019-11-06 ENCOUNTER — COMMUNICATION - HEALTHEAST (OUTPATIENT)
Dept: NEUROLOGY | Facility: CLINIC | Age: 29
End: 2019-11-06

## 2019-11-06 DIAGNOSIS — F06.30 MOOD DISORDER IN CONDITIONS CLASSIFIED ELSEWHERE: ICD-10-CM

## 2019-11-08 ENCOUNTER — COMMUNICATION - HEALTHEAST (OUTPATIENT)
Dept: NEUROLOGY | Facility: CLINIC | Age: 29
End: 2019-11-08

## 2019-11-08 DIAGNOSIS — F06.30 MOOD DISORDER IN CONDITIONS CLASSIFIED ELSEWHERE: ICD-10-CM

## 2019-11-09 RX ORDER — ATOMOXETINE 100 MG/1
100 CAPSULE ORAL DAILY
Qty: 30 CAPSULE | Refills: 3 | Status: SHIPPED | OUTPATIENT
Start: 2019-11-09

## 2019-11-17 ENCOUNTER — COMMUNICATION - HEALTHEAST (OUTPATIENT)
Dept: NEUROLOGY | Facility: CLINIC | Age: 29
End: 2019-11-17

## 2019-11-17 DIAGNOSIS — S06.9XAS MOOD DISORDER AS LATE EFFECT OF TRAUMATIC BRAIN INJURY (H): ICD-10-CM

## 2019-11-17 DIAGNOSIS — F06.30 MOOD DISORDER AS LATE EFFECT OF TRAUMATIC BRAIN INJURY (H): ICD-10-CM

## 2019-11-18 ENCOUNTER — COMMUNICATION - HEALTHEAST (OUTPATIENT)
Dept: NEUROLOGY | Facility: CLINIC | Age: 29
End: 2019-11-18

## 2019-11-24 ENCOUNTER — COMMUNICATION - HEALTHEAST (OUTPATIENT)
Dept: NEUROLOGY | Facility: CLINIC | Age: 29
End: 2019-11-24

## 2019-11-24 DIAGNOSIS — F41.1 ANXIETY STATE: ICD-10-CM

## 2019-11-25 ENCOUNTER — COMMUNICATION - HEALTHEAST (OUTPATIENT)
Dept: NEUROLOGY | Facility: CLINIC | Age: 29
End: 2019-11-25

## 2019-11-25 RX ORDER — HYDROXYZINE PAMOATE 25 MG/1
CAPSULE ORAL
Qty: 90 CAPSULE | Refills: 0 | Status: SHIPPED | OUTPATIENT
Start: 2019-11-25

## 2019-12-01 ENCOUNTER — COMMUNICATION - HEALTHEAST (OUTPATIENT)
Dept: NEUROLOGY | Facility: CLINIC | Age: 29
End: 2019-12-01

## 2019-12-01 DIAGNOSIS — S06.9XAS MOOD DISORDER AS LATE EFFECT OF TRAUMATIC BRAIN INJURY (H): ICD-10-CM

## 2019-12-01 DIAGNOSIS — F06.30 MOOD DISORDER AS LATE EFFECT OF TRAUMATIC BRAIN INJURY (H): ICD-10-CM

## 2019-12-05 ENCOUNTER — RECORDS - HEALTHEAST (OUTPATIENT)
Dept: ADMINISTRATIVE | Facility: OTHER | Age: 29
End: 2019-12-05

## 2019-12-10 ENCOUNTER — HOSPITAL ENCOUNTER (OUTPATIENT)
Dept: NEUROLOGY | Facility: CLINIC | Age: 29
Setting detail: THERAPIES SERIES
Discharge: STILL ADMITTED OR ADMINISTRATIVE | End: 2019-12-10
Attending: PSYCHIATRY & NEUROLOGY

## 2019-12-10 DIAGNOSIS — F06.30 MOOD DISORDER AS LATE EFFECT OF TRAUMATIC BRAIN INJURY (H): ICD-10-CM

## 2019-12-10 DIAGNOSIS — S06.9XAS MOOD DISORDER AS LATE EFFECT OF TRAUMATIC BRAIN INJURY (H): ICD-10-CM

## 2019-12-15 ENCOUNTER — COMMUNICATION - HEALTHEAST (OUTPATIENT)
Dept: NEUROLOGY | Facility: CLINIC | Age: 29
End: 2019-12-15

## 2019-12-15 DIAGNOSIS — S06.9XAS MOOD DISORDER AS LATE EFFECT OF TRAUMATIC BRAIN INJURY (H): ICD-10-CM

## 2019-12-15 DIAGNOSIS — F06.30 MOOD DISORDER AS LATE EFFECT OF TRAUMATIC BRAIN INJURY (H): ICD-10-CM

## 2019-12-16 ENCOUNTER — COMMUNICATION - HEALTHEAST (OUTPATIENT)
Dept: NEUROLOGY | Facility: CLINIC | Age: 29
End: 2019-12-16

## 2019-12-16 DIAGNOSIS — F06.30 MOOD DISORDER AS LATE EFFECT OF TRAUMATIC BRAIN INJURY (H): ICD-10-CM

## 2019-12-16 DIAGNOSIS — S06.9XAS MOOD DISORDER AS LATE EFFECT OF TRAUMATIC BRAIN INJURY (H): ICD-10-CM

## 2019-12-18 RX ORDER — CLONAZEPAM 0.5 MG/1
TABLET ORAL
Qty: 60 TABLET | Refills: 0 | Status: SHIPPED | OUTPATIENT
Start: 2019-12-18

## 2019-12-18 RX ORDER — VENLAFAXINE HYDROCHLORIDE 150 MG/1
CAPSULE, EXTENDED RELEASE ORAL
Qty: 60 CAPSULE | Refills: 3 | Status: SHIPPED | OUTPATIENT
Start: 2019-12-18

## 2019-12-31 ENCOUNTER — COMMUNICATION - HEALTHEAST (OUTPATIENT)
Dept: NEUROLOGY | Facility: CLINIC | Age: 29
End: 2019-12-31

## 2020-01-05 ENCOUNTER — COMMUNICATION - HEALTHEAST (OUTPATIENT)
Dept: NEUROLOGY | Facility: CLINIC | Age: 30
End: 2020-01-05

## 2020-01-05 DIAGNOSIS — F41.1 ANXIETY STATE: ICD-10-CM

## 2020-02-02 ENCOUNTER — COMMUNICATION - HEALTHEAST (OUTPATIENT)
Dept: NEUROLOGY | Facility: CLINIC | Age: 30
End: 2020-02-02

## 2020-02-02 DIAGNOSIS — S06.9XAS MOOD DISORDER AS LATE EFFECT OF TRAUMATIC BRAIN INJURY (H): ICD-10-CM

## 2020-02-02 DIAGNOSIS — F06.30 MOOD DISORDER AS LATE EFFECT OF TRAUMATIC BRAIN INJURY (H): ICD-10-CM

## 2020-02-03 RX ORDER — TOPIRAMATE 25 MG/1
TABLET, FILM COATED ORAL
Qty: 60 TABLET | Refills: 1 | Status: SHIPPED | OUTPATIENT
Start: 2020-02-03

## 2020-03-15 ENCOUNTER — COMMUNICATION - HEALTHEAST (OUTPATIENT)
Dept: NEUROLOGY | Facility: CLINIC | Age: 30
End: 2020-03-15

## 2020-03-15 DIAGNOSIS — S06.9XAS MOOD DISORDER AS LATE EFFECT OF TRAUMATIC BRAIN INJURY (H): ICD-10-CM

## 2020-03-15 DIAGNOSIS — F06.30 MOOD DISORDER AS LATE EFFECT OF TRAUMATIC BRAIN INJURY (H): ICD-10-CM

## 2020-03-29 ENCOUNTER — COMMUNICATION - HEALTHEAST (OUTPATIENT)
Dept: NEUROLOGY | Facility: CLINIC | Age: 30
End: 2020-03-29

## 2020-03-29 DIAGNOSIS — F06.30 MOOD DISORDER AS LATE EFFECT OF TRAUMATIC BRAIN INJURY (H): ICD-10-CM

## 2020-03-29 DIAGNOSIS — S06.9XAS MOOD DISORDER AS LATE EFFECT OF TRAUMATIC BRAIN INJURY (H): ICD-10-CM

## 2020-05-10 ENCOUNTER — COMMUNICATION - HEALTHEAST (OUTPATIENT)
Dept: NEUROLOGY | Facility: CLINIC | Age: 30
End: 2020-05-10

## 2020-05-10 DIAGNOSIS — F06.30 MOOD DISORDER AS LATE EFFECT OF TRAUMATIC BRAIN INJURY (H): ICD-10-CM

## 2020-05-10 DIAGNOSIS — S06.9XAS MOOD DISORDER AS LATE EFFECT OF TRAUMATIC BRAIN INJURY (H): ICD-10-CM

## 2020-08-23 ENCOUNTER — COMMUNICATION - HEALTHEAST (OUTPATIENT)
Dept: NEUROLOGY | Facility: CLINIC | Age: 30
End: 2020-08-23

## 2020-08-23 DIAGNOSIS — S06.9XAS MOOD DISORDER AS LATE EFFECT OF TRAUMATIC BRAIN INJURY (H): ICD-10-CM

## 2020-08-23 DIAGNOSIS — F06.30 MOOD DISORDER AS LATE EFFECT OF TRAUMATIC BRAIN INJURY (H): ICD-10-CM

## 2020-10-18 ENCOUNTER — COMMUNICATION - HEALTHEAST (OUTPATIENT)
Dept: NEUROLOGY | Facility: CLINIC | Age: 30
End: 2020-10-18

## 2020-10-18 DIAGNOSIS — F06.30 MOOD DISORDER AS LATE EFFECT OF TRAUMATIC BRAIN INJURY (H): ICD-10-CM

## 2020-10-18 DIAGNOSIS — S06.9XAS MOOD DISORDER AS LATE EFFECT OF TRAUMATIC BRAIN INJURY (H): ICD-10-CM

## 2020-10-18 RX ORDER — TRAZODONE HYDROCHLORIDE 100 MG/1
TABLET ORAL
Qty: 60 TABLET | Refills: 3 | Status: SHIPPED | OUTPATIENT
Start: 2020-10-18

## 2021-01-24 ENCOUNTER — COMMUNICATION - HEALTHEAST (OUTPATIENT)
Dept: NEUROLOGY | Facility: CLINIC | Age: 31
End: 2021-01-24

## 2021-01-24 DIAGNOSIS — F06.30 MOOD DISORDER AS LATE EFFECT OF TRAUMATIC BRAIN INJURY (H): ICD-10-CM

## 2021-01-24 DIAGNOSIS — S06.9XAS MOOD DISORDER AS LATE EFFECT OF TRAUMATIC BRAIN INJURY (H): ICD-10-CM

## 2021-01-25 RX ORDER — LAMOTRIGINE 25 MG/1
TABLET ORAL
Qty: 180 TABLET | Refills: 3 | Status: SHIPPED | OUTPATIENT
Start: 2021-01-25

## 2021-05-27 NOTE — TELEPHONE ENCOUNTER
She has been on a lot of different medications which have been minimally helpful.  Let us see how she does after meeting with the therapist.  I agree with the plan to utilize the ER as necessary as well.

## 2021-05-27 NOTE — TELEPHONE ENCOUNTER
"Edith called today in regards to having increase in depression and suicidal ideation rating of \"7\". She is having a lot of situational stresses from moving and having to change things over. She is with her boy friend now and that helps her. She has reached out to her therapist and will go to ER if necessary. She is requesting a change/addition to her mediation before her next appt on 4/25/19. She has been using the prn vistaril but it has not been that helpful, especially when she has a \"meltdown\". Please advise.   "

## 2021-05-27 NOTE — TELEPHONE ENCOUNTER
This RN attempted to call Edith twice today (once in the morning and once at this time ). Edith's phone went right to her voice mail both times. Left messages for her to call us back. Will await her return call.

## 2021-05-27 NOTE — TELEPHONE ENCOUNTER
Edith called back today and was pretty upset that no one had called her back and cared about her being this depressed. Relayed message from Dr. Hernández and she said she was going to therapy on Monday.  She will call us after to let us know what they said and how she feels.  She is still pretty down.

## 2021-05-28 NOTE — PROGRESS NOTES
Patient's impression of how medication is working? No    Compliant with Medication? Yes    Side Effects: Depression, anxiety, suicidal    Current Symptoms : Yes, depression    Pain (0-10) Yes, Headaches/Neck pain 5/10  Appetite change No  Sleep disturbance No, night sweats  Change in energy Yes, tired and isolated.   Change in interest Yes, lack of interest  Change in concentration No  Psychosis/Hallucinations No  Negative thoughts Yes  Mood swings Yes  Alcohol use Yes  Drug use Yes, marijuana once within the last 2 weeks.  Anxiety high  Sad/depressed mood high     Rock Oliveira TONJA 04/25/19 3925

## 2021-05-28 NOTE — PROGRESS NOTES
.  Outpatient Followup Psychiatric Evaluation      Pertinent History: Patient presents today for the purposes of medication management.  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  She had a possible seizure.  She required a  shunt.  She had cerebral edema with a 7 mm midline shift and a right parietal-occipital focal contusion.  She also had traumatic subarachnoid blood and a small subcortical parenchymal contusion in the left frontal bone.  In addition she had a frontal bone fracture anteriorly.  Please see the chart for full details.  The patient has been resistive to most medication therapies with no significant improvement.  In the past we had attempted to taper off Topamax but she had a decline in her mood and that was restarted.  She had been involved in DBT groups which she reported was helpful.  We had been tapering the Lamictal in the spring 2018 and the patient had a decline in mood.     I saw the patient in August 2018 and at that time we did increase the Topamax slightly.  Since that visit, over the phone we did decrease the Topamax back down and increase Strattera.    I saw the patient in December 2018.  At that time she told me she was doing fairly well but was struggling with some back pain issues.  We did not make any medication changes at that time.  Since that visit the patient has called the clinic stating she has been more depressed.    Current Symptoms:   She came in today quite irritable and agitated stating she did not receive calls back from the clinic when she was in distress.  There is evidence in the chart that there were frequent attempts made to call her but there was no answer.  In any case she agreed that perhaps she did not receive the calls because something on her and.  She states that she sought out help from her mother who provided support.  She stated she had to use the  "\"\"word\" to let her mother know that she was in a serious situation.    She tells me that she did graduate from her DBT program and was told to use \"the skills she learned\" when she was in situations like that.  She stated that was the advice she got when she sought out help from them.  She is getting a new therapist that she had prior to her brain injury and is comfortable with that and feels that nobody is \"fixing me\".  When asked what I could do she was quite agitated and irritable and stated I did not spend enough time with her or care about her and that my job was to fix her and I was not doing it.  We did get her mother on the phone who seemed well aware of the patient's recent situation and her struggles.  The patient has quite a bit more stress in her life as she is moved up to [facility] to live with her boyfriend.  There continues to be financial concerns.  She reports that things are very difficult because she is changing all her community services to be part of that [facility].  Despite all this she states she is no longer acutely suicidal and that has improved.    She continues to struggle with mood instability irritability and  Chronic fleeting thoughts of wishing she was dead.  This is all unchanged.  No psychosis.  She reports no new medical issues and denies side effects to the medication.    We did spend quite a bit of time talking about treatment options.  The patient's mother stated she never been on Zoloft and wondered whether that would be helpful.  I suggested that I had some concerns about changing from the Effexor that had been a very good medication for her due to recent situationally difficult stressors that may have contributed to her current presentation.  The patient agreed that she did not want to get off the Effexor.  We discussed a variety of options.  Apparently the patient has not been utilizing the PRN Vistaril other than a single 25 mg dose a day.  We did talk about utilizing " that to 3 times a day and increasing the dose to 25-50.  She was in agreement with that.  We also decided to increase the Topamax to 50 mg twice a day.  The patient appeared comfortable with this as did the patient's mother.  Risks and benefits were discussed.  Patient will continue to utilize emergency services as she is well versed with.        Current Medications: Please see chart. Medications personally reviewed.    Medication Compliance: yes    Side Effects to Medications:  No obvious side effects.      Vitals:  Wt Readings from Last 3 Encounters:   No data found for Wt     Temp Readings from Last 3 Encounters:   No data found for Temp     BP Readings from Last 3 Encounters:   No data found for BP     Pulse Readings from Last 3 Encounters:   No data found for Pulse         Mental Status Exam:    Appearance: Patient appeared frustrated irritable and angry.  She avoided eye contact.  She sat with her arms crossed.  No significant pain and no shortness of breath.  The patient however appears quite slow and flat.  Behavior: Patient was irritable and argumentative.  Frequently swearing.  Speech: Frequent swearing with pressured speech.  Not thick or slurred.  Sentence structure was intact.  She was able to initiate.  Mood/Affect: Depressed and irritable.  A bit more labile today.  Thought Content:  No evidence of psychosis. No recent reported psychosis.  Suicidal or Homicidal Thoughts: She had some more intense suicidal thoughts a couple of weeks ago but that has improved.  She states that she is at her baseline currently and has no plan or desire to harm herself or anybody else.  Thought Process/Formulation: Perhaps with some pressured thoughts.  She is able to track and follow conversation.  Associations: Slow.  Not loose.  No racing thoughts.  Fund of Knowledge: Able to participate a bit.  Somewhat limited effort however.  No apparent recent change.  Attention/Concentration: She is a bit perseverative but is able  to track and follow.  Concentration appears a bit impaired.  Insight: Perhaps a bit more impaired.  Judgement: No apparent recent change.    Memory:   Slow.  A bit slow with limited participation.  Motor Status:   No current tremor.  No reports of any recent change.  Orientation: No reports of any recent change.  Grossly unchanged.    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  I have changed that to 25 to 50 mg 3 times a day as needed.  We have also increased the patient's Topamax to 50 mg twice a day.    Other:   Patient will return to clinic in 6 weeks for medication check.  She agrees to call or return sooner with any questions, concerns or problems.  We will continue with her individual therapist.  We will continue with follow-up as per her prior DBT recommendations.    Continue with the support of the clinic, reassurance, and redirection. Staff monitoring and ongoing assessments per team plan. Current psychotropic medication appears to represent the minimum effective dosage and appears medically necessary. We will continue to monitor and reassess. This team will utilize appropriate emergency services if necessary. I will make myself available if concerns or problems arise.    Danilo Hernández MD

## 2021-05-29 NOTE — PROGRESS NOTES
.  Outpatient Followup Psychiatric Evaluation      Pertinent History: Patient presents today for the purposes of medication management.  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  She had a possible seizure.  She required a  shunt.  She had cerebral edema with a 7 mm midline shift and a right parietal-occipital focal contusion.  She also had traumatic subarachnoid blood and a small subcortical parenchymal contusion in the left frontal bone.  In addition she had a frontal bone fracture anteriorly.  Please see the chart for full details.  The patient has been resistive to most medication therapies with no significant improvement.  In the past we had attempted to taper off Topamax but she had a decline in her mood and that was restarted.  She had been involved in DBT groups which she reported was helpful.  We had been tapering the Lamictal in the spring 2018 and the patient had a decline in mood.     I saw the patient in August 2018 and at that time we did increase the Topamax slightly.  Since that visit, over the phone we did decrease the Topamax back down and increase Strattera.    I saw the patient in December 2018.  At that time she told me she was doing fairly well but was struggling with some back pain issues.  We did not make any medication changes at that time.  Since that visit the patient has called the clinic stating she has been more depressed.    I saw the patient in April 2019.  The patient was reporting that she was more anxious and having more difficulty with mood.  She was not utilizing the Vistaril all PRN and I did encourage her to do that.  Also we increased the patient's Topamax at that time.    Current Symptoms:   Patient presents today stating that her main concern is ongoing neck and back pain.  She states it has been excruciating for quite some time.  She went to the emergency room on  Monday where they gave her IV Norco as well as steroids.  She states that she continues to be in pain and she is following up with the pain clinic soon.  She states because of this her mood is not very good and she is tired all the time.  She admits to being sad.  She has her baseline periodic suicidal ideation but no specific plan and she states she is utilizing her DBT techniques and that is working.  She also finds her mother to be a significant source of help.  She is able to contract for safety and states she will not harm herself.    She denies having any psychosis.  She states sleep is difficult due to pain.  She states she continues with periodic anxiety but often forgets to take her Vistaril.  She states when she does take it is helpful.  She denies having any other new medical issues or diagnoses and denies side effects to the medication.  We discussed a variety of options available and she would like to increase the Topamax further.  Risks and benefits were discussed.        Current Medications: Please see chart. Medications personally reviewed.    Medication Compliance: yes    Side Effects to Medications:  No obvious side effects.      Vitals:  Wt Readings from Last 3 Encounters:   No data found for Wt     Temp Readings from Last 3 Encounters:   No data found for Temp     BP Readings from Last 3 Encounters:   No data found for BP     Pulse Readings from Last 3 Encounters:   No data found for Pulse         Mental Status Exam:    Appearance:  Patient appears slow flat and depressed.  Limited eye contact. Limited effort. No obvious shortness of breath. No obvious pain at this time.  Behavior: He stares at the floor the entire interview.  Limited movement. Not agitated. No restlessness.  Speech: Slow monotone and vague but she is able to dialogue and initiate.  Not pressured or rambling.  Soft-spoken.  Mood/Affect:  Flat, slow, depressed. No current anxiety or agitation. Not currently labile.  Thought  Content:  No evidence of psychosis. No recent reported psychosis.  Suicidal or Homicidal Thoughts:  None apparent or reported currently.  She does have fleeting thoughts of wishing she was dead periodically but this is baseline.  She states she will not act on this.  She states she is utilizing her DBT techniques effectively..   Thought Process/Formulation:  Slow. [place]. Limited effort. Limited interest. No evidence of any racing thoughts.  Associations:  Difficult to assess due to limited participation.  No obvious loosening of associations.  Slow. [place].  Fund of Knowledge: Appears adequate.  No reports of any significant recent change.  Attention/Concentration: Flat and slow.  She does need prompts and structure but she is able to follow and participate.  Insight: Fair.  Baseline.  Judgement: Adequate.  Baseline.  Memory:  Limited participation. Slow.  No obvious change.  Motor Status: Slow and flat.  Hypokinetic.  No current tremor.  Orientation: No reports of any recent change.  Grossly oriented.    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    Other:   Patient will return to clinic in 3 months for medication check.  She will follow-up with her medical doctors including the intake at the pain clinic regarding her pain.  She agrees to call or return sooner with any questions, concerns or problems.  We will continue with her individual therapist.  We will continue with follow-up as per her prior DBT recommendations.    Continue with the support of the clinic, reassurance, and redirection. Staff monitoring and ongoing assessments per team plan. Current psychotropic medication appears to represent the minimum effective dosage and appears medically necessary. We  will continue to monitor and reassess. This team will utilize appropriate emergency services if necessary. I will make myself available if concerns or problems arise.    Danilo Hernández MD

## 2021-06-01 NOTE — PROGRESS NOTES
".OUT PATIENT-CLINICAL SOCIAL WORK PROGRESS NOTE      9/6/2019           Edith Valenzuela is a 28 y.o. female who was seen in clinic by Dr. Hernández.      ASSESSMENT: Social work was alerted by SUASNNE Vickers that patient left a concerning message on the nursing line.  Dr. Hernández, patient's psychiatrist was alerted to message and requested a welfare check be called for patient. Patient left a voicemail on nursing line on Thursday 9/5 at approximately 1539 hrs.  The message is as follows: \"This is fucking bullshit about the venlafaxine. [employer] doesn't want to give me the two pills of 37.5 MG, so just fucking bump me up to 100 or 50.  I just dont fucking know, why don't I just fucking off myself.  This is all just too much!  I just can't.This is just so fucked up I need to call my mom.  Sitting at the [facility] [employer] pharmacy havent even been home.  That's fucked up. How about I dont know, no solution!\"  Pt then ended the message.     PLAN: Social work contacted [facility]'s department at [phone] and requested a welfare check on patient.  [facility]'s department will provide a welfare check for patient and activate any services necessary.    Update 09/09/19: SW followed up with [facility]'s department at [phone], regarding patient's status.  [employer] conducted a welfare check,  patient stated she had a difficult day on Thursday. She explained her medical situation and stated she was having a bad day and was much better on Friday.  No further interventions needed at this time.     Type of Service: Phone Call    Services Provided: Welfare Check Requested    Resources Provided: [facility] Department contacted to provide a Welfare Check on pt.           "

## 2021-06-01 NOTE — TELEPHONE ENCOUNTER
"\"This is fucking bullshit about the venlafaxine. [employer] doesn't want to give me the two pills of 37.5 MG, so just fucking bump me up to 100 or 50.  I just dont fucking know, why don't I just fucking off myself.  This is all just too much!  I just can't.This is just so fucked up I need to call my mom.  Sitting at the [facility] pharmacy havent even been home.  That's fucked up. How about I dont know, no solution!\"  Pt then ended the message.  I have reached out to our SW and Dr. Hernández and our clinic will be contacting pt to check on how she is doing.  I have been talking with the pharmacy the last few days with there being a shortage in venlafaxine of 75 MG her original dosage and they needed to change it to 2 37.5 MG tablets which her insurance needs a PA done which has been filled out and faxed back but still hasn't been approved.   "

## 2021-06-01 NOTE — PROGRESS NOTES
.  Outpatient Followup Psychiatric Evaluation      Pertinent History: Patient presents today for the purposes of medication management.  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  She had a possible seizure.  She required a  shunt.  She had cerebral edema with a 7 mm midline shift and a right parietal-occipital focal contusion.  She also had traumatic subarachnoid blood and a small subcortical parenchymal contusion in the left frontal bone.  In addition she had a frontal bone fracture anteriorly.  Please see the chart for full details.  The patient has been resistive to most medication therapies with no significant improvement.  In the past we had attempted to taper off Topamax but she had a decline in her mood and that was restarted.  She had been involved in DBT groups which she reported was helpful.  We had been tapering the Lamictal in the spring 2018 and the patient had a decline in mood.     I saw the patient in August 2018 and at that time we did increase the Topamax slightly.  Since that visit, over the phone we did decrease the Topamax back down and increase Strattera.    I saw the patient in December 2018.  At that time she told me she was doing fairly well but was struggling with some back pain issues.  We did not make any medication changes at that time.  Since that visit the patient has called the clinic stating she has been more depressed.    I saw the patient in April 2019.  The patient was reporting that she was more anxious and having more difficulty with mood.  She was not utilizing the Vistaril all PRN and I did encourage her to do that.  Also we increased the patient's Topamax at that time.    I saw the patient in June 2019.  She was having ongoing pain issues at that time.  She was utilizing the emergency room for IV Narco as well as steroids.  Her mood was not very good she stated  "based on that.  She had been utilizing her DBT techniques which were working.  She requests the possibility of an increase in her Topamax.  We did increase that to 75 mg twice a day.  Patient has called the clinic since the last visit extremely upset and swearing because of difficulty getting the size Effexor that she wanted.  Please see the chart for details.    Current Symptoms:   The patient again was angry confrontational and frequently swearing.  She again was defensive and avoidant and fairly dramatic.  She stated she has been feeling \"shitty\" for 4 to maybe 3 weeks for no particular reason.  She denies having any new stressors or changes in her environment.  She is unsure whether this may be related to the recent increase in Topamax.  He stated she was having trouble with sleep.  She stated she wants to sleep 9 hours a night and has been having a hard time doing that.  She occasionally takes some naps.  She was reluctant to discuss sleep hygiene with me and became quite irritable.  She denies having any hallucinations or delusions.  She reports she is having more frequent suicidal ideation but states she will not act on this.  She then went on a fairly long angry outburst about the clinic calling for a check on the welfare the last time she called in threatening to \"end it all\".  She then stated she wanted her old nurse that worked here back and was swearing and yelling at me about that.  She accused the clinic of being incompetent and not caring.  She however denied that she was actively suicidal and did not want to be in the hospital.  She states this is how she chronically is.  She tells me she is using the Vistaril 2-3 times a day and it is helpful.  She challenged me to make her better but was resistant to suggestions.  I did talk with her about the possibility of adding some low-dose Remeron.  After we discussed the risks and benefits she told me that if there was \"even a chance\" that would make her " "gain weight she did not want to use it.  I confronted her about how bad she was feeling and the fact that we could always stop the medication in the future.  She did eventually allow me to prescribe the medication.  I also talked with her about possibly reducing the Topamax as that apparently was not helpful.  She agreed to do that.  I also expressed to her my desire to try tapering other medications in the future if we could.  She was willing to do that.  She was able to contract for safety.        Current Medications: Please see chart. Medications personally reviewed.    Medication Compliance: yes    Side Effects to Medications:  No obvious side effects.      Vitals:  Wt Readings from Last 3 Encounters:   No data found for Wt     Temp Readings from Last 3 Encounters:   No data found for Temp     BP Readings from Last 3 Encounters:   No data found for BP     Pulse Readings from Last 3 Encounters:   No data found for Pulse         Mental Status Exam:    Appearance: Flat and slow.  Patient avoided eye contact.  She refused to sit in the pace waiting for her appointment and needed a separate room so she would not have to \"look at other people\".  No obvious pain.  Limited eye contact and impaired effort.  Not currently restless or agitated.  Irritable and angry with frequent swearing and pressured speech.  She was resistive, defensive and confrontational.  Behavior: Limited initiation.  The patient appears flat and somewhat disinterested at times.  Please see above.  Speech: Not thick or slurred.  Pressured.  Frequent swearing.  She tended to control the interview.  Mood/Affect: Patient appears depressed, flat and slow.  Irritable and angry.   Thought Content: No evidence of any obvious current psychosis.  No reports of any recent psychosis.  Suicidal or Homicidal Thoughts: Patient has chronic vague suicidal thoughts but no plan.  She tells me she is not actively suicidal.  She tells me she is able to contract for " safety.  Thought Process/Formulation: Slow and flat.  No racing thoughts.  There is a delay.  Patient needs prompts.  Limited effort.  Not obviously loose.  Associations: Slow with limited participation.  Guarded.  Fund of Knowledge: Limited effort.  No obvious recent change.  Attention/Concentration: Patient needs prompts and structure.  Limited initiation.  Limited effort.  She seems able to track and follow conversation but tends to control the conversation.  Insight:  Limited effort.  No obvious recent change.  Judgement:  Limited effort.   Memory: Limited participation but no obvious change.  Slow and flat.  Motor Status:  Limited participation. No current tremor.  Orientation: Apparently no recent change.  Appears grossly oriented.  Limited effort at this time..      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  Risks and benefits were discussed.  In 2 weeks the patient will begin tapering the Topamax to off by 25 mg twice a day every 2 weeks until done.  Risks and benefits were discussed.    Other:   Patient will return to clinic in 3 months for medication check.  She will follow-up with her medical doctors including the intake at the pain clinic regarding her pain.  She agrees to call or return sooner with any questions, concerns or problems.  We will continue with her individual therapist.  We will continue with follow-up as per her prior DBT recommendations.    Continue with the support of the clinic, reassurance, and redirection. Staff monitoring and ongoing assessments per team plan. Current psychotropic medication appears to represent the minimum effective dosage and appears medically necessary. We will continue to monitor and reassess. This team will utilize  appropriate emergency services if necessary. I will make myself available if concerns or problems arise.    Danilo Hernández MD

## 2021-06-01 NOTE — PROGRESS NOTES
Patient's impression of how medication is working? Patient express that she is suicidal and feels very unhappy.    Compliant with Medication? Yes     Side Effects: None    Pain (0-10) No  Appetite change No  Sleep disturbance No  Change in energy No  Change in interest Yes  Change in concentration No  Psychosis/Hallucinations No  Negative thoughts Yes suicidal   Mood swings Yes  Alcohol use Yes  Drug use No  Anxiety high  Sad/depressed mood high

## 2021-06-01 NOTE — TELEPHONE ENCOUNTER
We did not discuss that at the most recent visit but we have discussed that in the past and I would like to avoid using that medication at this time.  Would you please let them know this?

## 2021-06-01 NOTE — TELEPHONE ENCOUNTER
Prior Authorization Request  Who s requesting:  Pharmacy  Pharmacy Name and Location: [facility]   Medication Name:  venlafaxine (EFFEXOR-XR) 37.5 MG 24 hr capsule  Insurance Plan: MEDICARE   Insurance Member ID Number:  [id number]  Informed patient that prior authorizations can take up to 10 business days for response:   Yes  Okay to leave a detailed message: Yes

## 2021-06-01 NOTE — TELEPHONE ENCOUNTER
"Patient calling in regards to new medication Remeron. Patient has the desire to eat more often than normal, and \" gaining weight is not an option\"  for her. What should patient do from here on out?   "

## 2021-06-01 NOTE — TELEPHONE ENCOUNTER
Ana Maria calling from [facility], on behalf of patient. Ana Maria would like to know if medication Catemine was discussed with patient as an option for her depression. Please give ana maria a call at your earliest convenience.      [phone]

## 2021-06-01 NOTE — TELEPHONE ENCOUNTER
I am hoping that the medication will help her mood and any impulsivity.  This may actually help her to lose weight.  If she feels she cannot avoid eating more she could stop the medication and see if those feelings go away.

## 2021-06-02 NOTE — TELEPHONE ENCOUNTER
I am okay with her discontinuing the Remeron if she chooses due to the weight gain.  There are limited other options.  She has been on a variety of medications which have not been helpful or not tolerated.  We may have to discuss this at the next meeting.  Let us see if she is feeling okay off the Remeron and she can decide whether she wants to stay off that medication.

## 2021-06-02 NOTE — PROGRESS NOTES
.  Outpatient Followup Psychiatric Evaluation      Pertinent History: Patient presents today for the purposes of medication management.  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  She had a possible seizure.  She required a  shunt.  She had cerebral edema with a 7 mm midline shift and a right parietal-occipital focal contusion.  She also had traumatic subarachnoid blood and a small subcortical parenchymal contusion in the left frontal bone.  In addition she had a frontal bone fracture anteriorly.  Please see the chart for full details.  The patient has been resistive to most medication therapies with no significant improvement.  In the past we had attempted to taper off Topamax but she had a decline in her mood and that was restarted.  She had been involved in DBT groups which she reported was helpful.  We had been tapering the Lamictal in the spring 2018 and the patient had a decline in mood.     I saw the patient in August 2018 and at that time we did increase the Topamax slightly.  Since that visit, over the phone we did decrease the Topamax back down and increase Strattera.    I saw the patient in December 2018.  At that time she told me she was doing fairly well but was struggling with some back pain issues.  We did not make any medication changes at that time.  Since that visit the patient has called the clinic stating she has been more depressed.    I saw the patient in April 2019.  The patient was reporting that she was more anxious and having more difficulty with mood.  She was not utilizing the Vistaril all PRN and I did encourage her to do that.  Also we increased the patient's Topamax at that time.    I saw the patient in June 2019.  She was having ongoing pain issues at that time.  She was utilizing the emergency room for IV Narco as well as steroids.  Her mood was not very good she stated  based on that.  She had been utilizing her DBT techniques which were working.  She requests the possibility of an increase in her Topamax.  We did increase that to 75 mg twice a day.  Patient has called the clinic since the last visit extremely upset and swearing because of difficulty getting the size Effexor that she wanted.  Please see the chart for details.    I saw the patient in June 2019.  She was angry confrontational and stated she had felt bad for a couple of weeks.  No new stressors.  We had previously increase the Topamax and the decide to taper that slowly to off.  We also did add some low-dose Remeron and she stated she wanted to sleep more.  Since that visit she called to state that she wanted to stop the Remeron because she was worried about weight gain.  She also requested being put on a stimulant.  She also wanted to know options for outpatient programming.    Current Symptoms:   I met with the patient today.  We also later phone conference to her mother.  The patient tells me she has been struggling and the mother agrees.  The patient states her mood is worse.  She states she is having more breakdowns.  She becomes angry and irritable.  She states she has had more thoughts of wishing she was dead although states she has no plan and states she will not harm herself.  She has had no episodes of self injury.  She states she is frequently calling her mother or [facility] for support and that is helpful.  She does not identify any new stressors but states the weight gain with the Remeron may have been the trigger.  She reports no other new issues.  In hindsight she believes perhaps the Topamax had been helpful as her mood is progressively gotten worse since we have decreased and eliminated that.  The patient denies having any hallucinations or delusions.  No change in her cognition.  She reports difficulty focusing.  She again states she wants to try a stimulant but I believe at this point that might be  counterproductive given her impulsivity racing thoughts and hypomanic type behavior.  The patient has had no new medical diagnoses.  No new allergies.  No other side effects to the medication.  She continues with her individual therapist.  I suggested she consider getting back into DBT programming and she states she will do that.  The patient's mother agreed.    We discussed future possible changes in medication and trying to simplify her medication regime.  We discussed the possibility of Trintellix.  We may try that in the future.        Current Medications: Please see chart. Medications personally reviewed.    Medication Compliance: yes    Side Effects to Medications:  No obvious side effects.      Vitals:  Wt Readings from Last 3 Encounters:   No data found for Wt     Temp Readings from Last 3 Encounters:   No data found for Temp     BP Readings from Last 3 Encounters:   No data found for BP     Pulse Readings from Last 3 Encounters:   No data found for Pulse         Mental Status Exam:    Appearance: Patient is in no obvious distress.  No significant pain and no shortness of breath.  The patient however appears quite slow and flat.  She appears obviously frustrated and avoids eye contact.  Behavior: Patient does participate but does not initiate much.  Slow.  Limited effort.  No reports of any recent significant behavioral difficulties.  Speech: Slightly pressured but a bit less irritable today.  Monotone.  Mood/Affect: Depressed.  Slow.  The patient appears frustrated.  No anxiety.  No lability.  No yanely.  Somewhat less irritability.  Thought Content:  No evidence of psychosis. No recent reported psychosis.  Suicidal or Homicidal Thoughts:  None apparent or reported.   Thought Process/Formulation: Needing prompts to participate.  Slow and concrete.  No racing thoughts.  The patient is able to follow some conversation.  Associations: Slow.  Not loose.  No racing thoughts.  Fund of Knowledge: Able to  participate a bit.  Somewhat limited effort however.  No apparent recent change.  Attention/Concentration: Tracking and following some simple conversation.  The patient is slow and flat.  No obvious recent change.  Insight: No apparent recent change.    Judgement: No apparent recent change.    Memory:   Slow.  A bit slow with limited participation.  Motor Status:   No current tremor.  No reports of any recent change.  Orientation: No reports of any recent change.  Grossly unchanged.    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  We discussed the possibility of Trintellix.  We may try that in the future.  Patient will continue with her individual therapist.  Continue to use [facility] and her mother for support as that has been quite helpful and will look into the possibility of restarting DBT therapy.  Also the patient is considering getting a second opinion from a psychiatrist closer to [place] where she lives.  I did support that as well.    Other:   Patient will return to clinic in 3 months for medication check.  She will follow-up with her medical doctors including the intake at the pain clinic regarding her pain.  She agrees to call or return sooner with any questions, concerns or problems.  We will continue with her individual therapist.  We will continue with follow-up as per her prior DBT recommendations.    Continue with the support of the clinic, reassurance, and redirection. Staff monitoring and ongoing assessments per team plan. Current psychotropic medication appears to represent the minimum  effective dosage and appears medically necessary. We will continue to monitor and reassess. This team will utilize appropriate emergency services if necessary. I will make myself available if concerns or problems arise.    Danilo Hernández MD

## 2021-06-02 NOTE — TELEPHONE ENCOUNTER
Relayed this message to the patient.  She is wondering if she could be prescribed something for focus and attention.  She states that Adderall has worked in the past.  She feels as if she is unable to finish tasks and has a hard time with focus even for the easiest of things.  I let her know I would run this by you to see if you were wanting to prescribe that.

## 2021-06-02 NOTE — TELEPHONE ENCOUNTER
I would prefer to stay away from stimulants if possible.  I cannot recall whether she had a ineffective or bad experience with Wellbutrin.  That could be an option as an augmenter.

## 2021-06-02 NOTE — TELEPHONE ENCOUNTER
Spoke to patients mother alisha in regards to previous message. She verbally expressed her understanding, and will be at the patients appointment tomorrow to further discuss this issue.

## 2021-06-02 NOTE — TELEPHONE ENCOUNTER
Patients mother calling in regards to laine's mental health status. She hasn't been feeling well mentally and physically for a while. She is becoming more suicidal. Patient and mother agree that she needs recommendations to either another psychiatrist to get clarity on current medications, or referral recommendations for an inpatient or outpatient mental health program, which is the patients and mothers first choice.     Please advise.

## 2021-06-02 NOTE — TELEPHONE ENCOUNTER
Pt was very stable with her weight before starting Remeron.  She wants to d/c medication. Since starting it, over the course of 1.5 weeks, she has gained 6 pounds and wants to know if she can just d/c medication.  Is there another medication to try that wont cause weight gain?

## 2021-06-02 NOTE — TELEPHONE ENCOUNTER
I am not sure what they mean by recommendations for clarity on her current medications.  We should be able to let her what the current medication plan is, or if there is any questions on what she is to be taking.  If she wants a second opinion I would be happy to try and facilitate that.  An inpatient mental health program will likely be assessed through an intake process that will determine whether she is actively suicidal and whether she can be safely managed on an outpatient basis.  As far as outpatient programs which her insurance would allow we will have to look into this.  Patient should utilize emergency services if she cannot be safe.

## 2021-06-03 NOTE — TELEPHONE ENCOUNTER
Pt called stating that she stopped taking her Strattera because she didn't know she was out and did not have time to get to the pharmacy.  She is insisting that she gets switched from that medication to low dose of adderall because they both have side effects of impulsive behaviors but feels that the change in medications would benefit her better than current medication dosage.

## 2021-06-03 NOTE — TELEPHONE ENCOUNTER
Started Topiramate 50 MG two times a day last Sunday.  Off of Remeron and started her Strattera for the last 4 days.  Still feeling very suicidal, Saturday her urge to commit suicide was at a 10 and boyfriend was there to stop her.  She wanted to jump out of a moving truck.  She feels so emotional and cannot control these concerns.  She states she's not going to kill herself.  Please see prior message I had sent.

## 2021-06-03 NOTE — TELEPHONE ENCOUNTER
Would you please let her know that I do not like giving patients both psychostimulants as well as benzodiazepines at the same time.  They are counterproductive.  I would like to avoid the Adderall at this time.

## 2021-06-03 NOTE — TELEPHONE ENCOUNTER
Let us continue with the current plan and see how she does with restarting the Topamax.  If she is feeling suicidal she should go to an emergency room for further evaluation and treatment.

## 2021-06-04 NOTE — PROGRESS NOTES
.  Outpatient Followup Psychiatric Evaluation      Pertinent History: Patient presents today for the purposes of medication management.  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  She had a possible seizure.  She required a  shunt.  She had cerebral edema with a 7 mm midline shift and a right parietal-occipital focal contusion.  She also had traumatic subarachnoid blood and a small subcortical parenchymal contusion in the left frontal bone.  In addition she had a frontal bone fracture anteriorly.  Please see the chart for full details.  The patient has been resistive to most medication therapies with no significant improvement.  In the past we had attempted to taper off Topamax but she had a decline in her mood and that was restarted.  She had been involved in DBT groups which she reported was helpful.  We had been tapering the Lamictal in the spring 2018 and the patient had a decline in mood.     I saw the patient in August 2018 and at that time we did increase the Topamax slightly.  Since that visit, over the phone we did decrease the Topamax back down and increase Strattera.    I saw the patient in December 2018.  At that time she told me she was doing fairly well but was struggling with some back pain issues.  We did not make any medication changes at that time.  Since that visit the patient has called the clinic stating she has been more depressed.    I saw the patient in April 2019.  The patient was reporting that she was more anxious and having more difficulty with mood.  She was not utilizing the Vistaril all PRN and I did encourage her to do that.  Also we increased the patient's Topamax at that time.    I saw the patient in June 2019.  She was having ongoing pain issues at that time.  She was utilizing the emergency room for IV Narco as well as steroids.  Her mood was not very good she stated  based on that.  She had been utilizing her DBT techniques which were working.  She requests the possibility of an increase in her Topamax.  We did increase that to 75 mg twice a day.  Patient has called the clinic since the last visit extremely upset and swearing because of difficulty getting the size Effexor that she wanted.  Please see the chart for details.    I saw the patient in June 2019.  She was angry confrontational and stated she had felt bad for a couple of weeks.  No new stressors.  We had previously increase the Topamax and the decide to taper that slowly to off.  We also did add some low-dose Remeron and she stated she wanted to sleep more.  Since that visit she called to state that she wanted to stop the Remeron because she was worried about weight gain.  She also requested being put on a stimulant.  She also wanted to know options for outpatient programming.    I saw the patient in October 2019.  Her mother was present by phone conference.  The patient's mood was worse.  She was more angry and irritable.  She states that weight gain from the Remeron may have been a trigger.  Also she felt that stopping the Topamax may have been problematic.  In any case we restarted the Topamax at that time and stop the Remeron.  She called the clinic multiple times following that again requesting starting Adderall.  We had suggested restarting her therapy and DBT programming.  She was talking about getting a second opinion with a psychiatrist closer to her home.  She was verbally abusive with the staff on multiple occasions.  She had stopped her Strattera but then restarted that.  We were considering a trial of Trintellix.    Current Symptoms:   Patient tells me she is actually doing a bit better with the recent restarting of the Topamax and increase as well as the restarting the Strattera.  She states there have been a few episodes of significant behavioral dyscontrol.  She states on November 23 she was riding in  "the car with her boyfriend they were having a argument and she stated she could visualize herself jumping out of the car and killing herself.  She states she is never had an experience like that before.  \"It was like I switched\".  She stated she \"saw it happen\".  She told her boyfriend that she had the urge to jump out of the car and he grabbed her leg and they hugged each other and she was able to reconstitute herself.  She states another episode happened this morning when she was leaving to come here and she spilled her coffee in her car came in the house slammed the door was angry grabbed a towel and went back outside to the car we tried to call her boyfriend 3 times to help and he did not so she came in began screaming at him while he was in the bathroom and grabbed him.  She stated that she had a \"demonic voice\".  She had recollection of each event and there did not seem to be any amnestic symptoms associated.    Patient states she is still frustrated because she is gaining weight so in hindsight that may not have been the Remeron.  She states there is been no change in her cognition sleep or appetite.  She denies psychosis.  He has had a chronic fleeting thoughts of suicide but that is not worse and she states she is able to contract for safety.  She is restarted her DBT groups and finds that to be helpful.  She continues with her individual therapist.  No new medical diagnoses or treatments.  No new allergies.  She denies side effects to the medication.  We discussed treatment options and she would be willing to increase the Topamax to 75 mg twice a day.  Risks and benefits were discussed.        Current Medications: Please see chart. Medications personally reviewed.    Medication Compliance: yes    Side Effects to Medications:  No obvious side effects.      Vitals:  Wt Readings from Last 3 Encounters:   No data found for Wt     Temp Readings from Last 3 Encounters:   No data found for Temp     BP Readings " from Last 3 Encounters:   No data found for BP     Pulse Readings from Last 3 Encounters:   No data found for Pulse         Mental Status Exam:    Appearance:  Patient appears slow and flat.  No obvious shortness of breath. No obvious pain at this time.  Behavior: Slow.  Needing prompts to participate.  Not agitated. No restlessness.  No reports of any significant recent behavioral dyscontrol.  She is actually under fairly good control and was less irritable and agitated today.  Not confrontational.  Speech: Somewhat monotone but she was able to initiate and dialogue.  Mood/Affect:  Flat, slow, depressed.  Blunted in her affect.  No current anxiety or agitation.  She has periodic lability by her report.  Thought Content:  No evidence of psychosis. No recent reported psychosis.  Suicidal or Homicidal Thoughts:  None apparent or reported.   Thought Process/Formulation:  Slow. [place]. No evidence of any racing thoughts.  Able to track and follow.  Associations: No obvious loosening of associations.  Slow. [place].  Fund of Knowledge:  Please see the therapy notes. No apparent recent change.  Attention/Concentration:  Flat slow.  Able to follow some simple conversation. No apparent recent change.  Insight: No apparent recent change.    Judgement: No apparent recent change.    Memory:   Slow.   Motor Status:   No current tremor.  Orientation: No reports of any recent change..     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  She is going to continue with her DBT therapy as well as her individual therapist.  She continues to work with her medical doctors on her pain issues.    Other:   She will return here in 4 to 6 weeks for medication check and agrees to call before then  with any questions or concerns.    Continue with the support of the clinic, reassurance, and redirection. Staff monitoring and ongoing assessments per team plan. Current psychotropic medication appears to represent the minimum effective dosage and appears medically necessary. We will continue to monitor and reassess. This team will utilize appropriate emergency services if necessary. I will make myself available if concerns or problems arise.    Danilo Hernández MD

## 2021-06-10 NOTE — PROGRESS NOTES
".  Outpatient Followup Psychiatric Evaluation      Pertinent History: Patient presents today for the purposes of medication management.  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  She had a possible seizure.  She required a  shunt.  She had cerebral edema with a 7 mm midline shift and a right parietal-occipital focal contusion.  She also had traumatic subarachnoid blood and a small subcortical parenchymal contusion in the left frontal bone.  In addition she had a frontal bone fracture anteriorly.  Please see the chart for full details.  The patient has been resistive to most medication therapies with no significant improvement.  At the last visit we continued tapering the patient's Lamictal.  Prior to that we did increase the Strattera.    Current Symptoms:   Patient believes she is doing somewhat better.  She admits periodic sadness and stated last night she was extremely sad and began having fleeting thoughts of wishing she was dead.  That has resolved and she is having no current suicidal ideation and is able to contract for safety.  She states this periodically happens due to ongoing stressors but they were things she did not want to talk about.  She states there is stable and not currently a problem.  She presents with no collateral information.    Patient reports that she started some \"keto OS powder\" which    Patient reports she is sleeping fairly well.  No psychosis.  She denies having any change in cognition.  She believes she is a bit less tired during the day and does wonder whether it is related to the decrease in Lamictal.  Apparently our Lamictal med list is incorrect here.  My recollection and the patient's report is that she is taking Lamictal 125 mg twice a day.  She is willing to continue with the taper of that medication.  She offers no other concerns or complaints at this time.  No " reports of any recent eating disorder symptoms.    Current Medications: Please see chart. Medications personally reviewed.    Medication Compliance: yes    Side Effects to Medications:  No obvious side effects.      Vitals:  Wt Readings from Last 3 Encounters:   No data found for Wt     Temp Readings from Last 3 Encounters:   No data found for Temp     BP Readings from Last 3 Encounters:   No data found for BP     Pulse Readings from Last 3 Encounters:   No data found for Pulse         Mental Status Exam:   Patient was slow and flat.  Not agitated or irritable today.  She continues to appear depressed but is not anxious.  No lability.  Flat and slow with restricted affect.  Her speech is slow monotone but she is quite talkative and tends to ramble.  She is difficult to redirect and is overly inclusive.  There is no pressured quality.  Attention and concentration are baseline impaired.  Thought content does not show any hallucinations or delusions.  No suicidal or homicidal ideation.  Thought formation does not show the patient to be loose.  Insight, judgment and memory are all baseline impaired and unchanged.  Fund of knowledge is baseline impaired.    Diagnosis managed and treated at today's visit :    Axis I: Cognitive disorder and mood disorder secondary to TBI   History of bulimia.    Axis II: Personality disorder, NOS by history (borderline traits)    Axis III: Please see initial psychiatric consultation note      Plan:  Medication Adjustment:  I am going to decrease the Lamictal to 100 mg twice a day..    Other:   Return in 3 months for medication check.  She agrees to call before then with any concerns or questions.  Continue with the support of the clinic, reassurance, and redirection. Staff monitoring and ongoing assessments per team plan. Current psychotropic medication appears to represent the minimum effective dosage and appears medically necessary. We will continue to monitor and reassess. This team  will utilize appropriate emergency services if necessary. I will make myself available if concerns or problems arise.    Danilo Hernández

## 2021-06-10 NOTE — PROGRESS NOTES
Patient's impression of how medication is working? F/u appt, feeling sad increase in anxiety.      Compliant with Medication? Yes     Side Effects: None    Current Symptoms : No    Pain (0-10) No  Appetite change No  Negative thoughts Yes  Alcohol use   Yes   Drug use No  Mood swings Yes  Sleep disturbance Yes  Change in interest No  Change in energy No  Change in concentration No  Psychosis/Hallucinations No  Anxiety high  Sad/depressed mood high

## 2021-06-11 NOTE — PROGRESS NOTES
Patient's impression of how medication is working? Pt said med are okay, still tired and stop taking Clonazaphen at night.     Compliant with Medication? yes    Side Effects: Anxiety    Current Symptoms : No    Pain (0-10) No  Appetite change Yes and increased  Negative thoughts Yes  Alcohol use No  Drug use No  Mood swings Yes  Sleep disturbance Yes and pt is sleeping better  Change in interest No  Change in energy Yes and low   Change in concentration No  Psychosis/Hallucinations No  Anxiety high  Sad/depressed mood high

## 2021-06-11 NOTE — PROGRESS NOTES
.  Outpatient Followup Psychiatric Evaluation      Pertinent History: Patient presents today for the purposes of medication management.  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  She had a possible seizure.  She required a  shunt.  She had cerebral edema with a 7 mm midline shift and a right parietal-occipital focal contusion.  She also had traumatic subarachnoid blood and a small subcortical parenchymal contusion in the left frontal bone.  In addition she had a frontal bone fracture anteriorly.  Please see the chart for full details.  The patient has been resistive to most medication therapies with no significant improvement.  We have been tapering the patient's Lamictal.  Apparently the patient has stopped taking the Klonopin at night.    Current Symptoms:   Patient admits that she is doing better.  She states her mood is generally better but she still has situational frustration and depression and with that comes fleeting thoughts of wishing she was dead but no set plan.  She believes those times are less intense and overall improving.  She is able to contract for safety.  She denies having any significant change in her cognition but perhaps is concentrating a bit better.  She is sleeping well.  Her appetite is possibly improved a bit.  No hallucinations or delusions.  She states that her concentration and focus is still a problem and worse that she is anxious.  She is unsure whether the as needed Vistaril is helpful but she does occasionally use that.    She reports that she lives alone with her cat but spends quite a bit of time with family.  Her parents have moved up to a cabin permanently but she does have other support.  She states she is choosing to avoid friends that have not been healthy for her and prefers to be with family.  She is reentered a DBT group that she had previously been asked to  leave and states that is going well.  She is willing to continue with the taper of Lamictal.  We discussed the risks and benefits of that.    Current Medications: Please see chart. Medications personally reviewed.    Medication Compliance: yes    Side Effects to Medications:  No obvious side effects.      Vitals:  Wt Readings from Last 3 Encounters:   No data found for Wt     Temp Readings from Last 3 Encounters:   No data found for Temp     BP Readings from Last 3 Encounters:   No data found for BP     Pulse Readings from Last 3 Encounters:   No data found for Pulse         Mental Status Exam:   Patient appears fairly bright and alert today.  Although she does not smile much and is still somewhat restricted in her affect she certainly participating better and initiating more.  No obvious pain or shortness of breath.  Attention and concentration are better.  Her mood appears still to be somewhat depressed but much less irritable.  No anxiety.  Affect is still restricted but better participation.  No lability.  Speech shows intact sentence structure with better spontaneity and initiation.  She still somewhat monotone.  Not pressured or rambling but able to dialogue better.  Thought content does not show any hallucinations or delusions.  No suicidal or homicidal ideation.  Thought formation does not show the patient to be loose.  She is quicker to respond.  Answers are consistent.  Insight, judgment and memory are all unchanged.  She is however participating better in offers more history.  Fund of knowledge is fair.    Diagnosis managed and treated at today's visit :    Axis I: Cognitive disorder and mood disorder secondary to TBI   History of bulimia.    Axis II: Personality disorder, NOS by history (borderline traits)    Axis III: Please see initial psychiatric consultation note      Plan:  Medication Adjustment:  I am going to decrease the Lamictal to 75 mg twice a day for one month then decrease down to 50 mg  twice a day.    Other:   Return in 3 months for medication check.  She agrees to call before then with any concerns or questions.  Continue with the support of the clinic, reassurance, and redirection. Staff monitoring and ongoing assessments per team plan. Current psychotropic medication appears to represent the minimum effective dosage and appears medically necessary. We will continue to monitor and reassess. This team will utilize appropriate emergency services if necessary. I will make myself available if concerns or problems arise.    Danilo Hernández

## 2021-06-13 NOTE — PROGRESS NOTES
".  Outpatient Followup Psychiatric Evaluation      Pertinent History: Patient presents today for the purposes of medication management.  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  She had a possible seizure.  She required a  shunt.  She had cerebral edema with a 7 mm midline shift and a right parietal-occipital focal contusion.  She also had traumatic subarachnoid blood and a small subcortical parenchymal contusion in the left frontal bone.  In addition she had a frontal bone fracture anteriorly.  Please see the chart for full details.  The patient has been resistive to most medication therapies with no significant improvement.  We had been tapering the Lamictal however the patient had worsening mood and we did increase the Lamictal since the last visit.    Current Symptoms:   Patient reports that she has been off the Topamax for about a week or 2 and is noted a decline in her mood.  She has been more irritable at work and is having verbal outbursts.  She states this frustrates her and makes her feel guilty.  She states she feels hopeless at times due to this and again is thinking about how frustrating life can be and that \"I have gotten nowhere in life\".  She admits fleeting thoughts of wishing she was dead but has no suicide plan and states \"I am able to commit to safety still.\"  She denies having any hallucinations or delusions.  No panic symptoms.  She sleeping well.  No change in cognition or appetite.  She denies side effects to the medication.  She states that she continues to be involved in DBT group and this is been helpful.  She also has been part of a volleyball league on Wednesday nights and enjoys this.  As stated above she is able to contract for safety and states she would like to restart her Topamax.  She is unsure whether the insurance company will fill it for 10 days so I have given her a " 10 day supply.  She may pay out of pocket for this.    Current Medications: Please see chart. Medications personally reviewed.    Medication Compliance: yes    Side Effects to Medications:  No obvious side effects.      Vitals:  Wt Readings from Last 3 Encounters:   No data found for Wt     Temp Readings from Last 3 Encounters:   No data found for Temp     BP Readings from Last 3 Encounters:   No data found for BP     Pulse Readings from Last 3 Encounters:   No data found for Pulse         Mental Status Exam:   Patient appears fairly bright and alert today.  Although she does not smile much and is still some patient was in no pain and not short of breath.  She had variable eye contact and was a bit gamy.  She accused me of rushing her at one point.  At another point she told me that I had beautiful eyes.  Her speech was soft spoken and monotone but she had initiation.  She was somewhat vague and had some awkward pauses at times.  She did not appear to be labile or manic.  She was a bit restricted in her affect.  She was adequately dressed and groomed.  Mood was somewhat depressed.  Not anxious.  No lability.  Perhaps slightly irritable and defensive at times.  Thought content does not show any hallucinations or delusions.  No suicidal or homicidal ideation.  Thought formation does not show her to be loose.  She is able to track but was a bit disorganized and had me repeat things multiple times.  She seemed to smile when she disrupted our conversations from time to time.  Insight, judgment and memory all appear baseline and unchanged.  Fund of knowledge is baseline.    Diagnosis managed and treated at today's visit :    Axis I: Cognitive disorder and mood disorder secondary to TBI   History of bulimia.    Axis II: Personality disorder, NOS by history (borderline traits)    Axis III: Please see initial psychiatric consultation note      Plan:  Medication Adjustment:  We are going to reorder the Topamax.  At this time  we will continue with the recent increase in the Lamictal.    Other:   Return in 6 weeks for medication check.  She agrees to call before then with any concerns or questions.  The patient has chronic thoughts of wishing she was dead and suicide at baseline.  She reports this is no different recently.  She states she is able to contract for safety.        Continue with the support of the clinic, reassurance, and redirection. Staff monitoring and ongoing assessments per team plan. Current psychotropic medication appears to represent the minimum effective dosage and appears medically necessary. We will continue to monitor and reassess. This team will utilize appropriate emergency services if necessary. I will make myself available if concerns or problems arise.    Danilo Hernández

## 2021-06-13 NOTE — PROGRESS NOTES
Patient's impression of how medication is working? Okay    Compliant with Medication? Yes    Side Effects: None    Current Symptoms : Yes    Pain (0-10) No  Appetite change No  Negative thoughts yes, pt Is having suicidal ideations   Alcohol use No  Drug use No  Mood swings Yes  Sleep disturbance No  Change in interest Yes  Change in energy Yes  Change in concentration No  Psychosis/Hallucinations No  Anxiety high  Sad/depressed mood moderate

## 2021-06-14 NOTE — PROGRESS NOTES
Patient's impression of how medication is working? Getting headaches    Compliant with Medication? yes    Side Effects: Headache    Current Symptoms : Yes    Any Concerns? Yes  Having problems with  Suicidal thoughts    Pain (0-10) Yes  Appetite change No  Sleep disturbance No  Change in energy Yes  Change in interest N/A  Change in concentration No  Psychosis/Hallucinations No  Negative thoughts Yes  Mood swings Yes  Alcohol use Yes  Drug use No  Anxiety high  Sad/depressed mood high

## 2021-06-14 NOTE — PROGRESS NOTES
.  Outpatient Followup Psychiatric Evaluation      Pertinent History: Patient presents today for the purposes of medication management.  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  She had a possible seizure.  She required a  shunt.  She had cerebral edema with a 7 mm midline shift and a right parietal-occipital focal contusion.  She also had traumatic subarachnoid blood and a small subcortical parenchymal contusion in the left frontal bone.  In addition she had a frontal bone fracture anteriorly.  Please see the chart for full details.  The patient has been resistive to most medication therapies with no significant improvement.  Prior to the last visit she had been off Topamax for about 2 weeks with some decline in her mood.  At the last visit we had restarted the Topamax.  She had been involved in DBT groups which she reported was helpful.  We had been tapering the Lamictal that the patient had a decline in mood.  We have recently increased the Lamictal a couple of times.    Current Symptoms:   Patient admits that she is doing a bit better.  She believes the medication changes of made her headaches less severe.  She also reports many fewer outbursts and her mood is been a bit better.  She has been attending DBT groups and finds that to be helpful.  She tells me that she sleeping relatively well but admits she feels tired during the day.  She gets off work at 10 PM and has to ice her back and is usually in bed and falls asleep about 1130.  She has to get up at 615 to attend DBT.  She uses an alarm clock to wake up in the morning.    Patient denies having any hallucinations.  She does report she periodically has thoughts of not wanting to be here which is chronic but she has no suicide plans and is not engaged in self-injurious behavior.  She states she feels safe and is able to contract for safety.  She  does report she has had some conflict with some male peers but did not want to go into this.  She states she is able to maintain safety and has no thoughts of harming anybody else.  She denies side effects to the medication.  She denies change in her appetite.  She is not aware of any side effects to the medication is willing to continue with the current treatment plan.  We did spend some time talking about sleep hygiene.        Current Medications: Please see chart. Medications personally reviewed.    Medication Compliance: yes    Side Effects to Medications:  No obvious side effects.      Vitals:  Wt Readings from Last 3 Encounters:   No data found for Wt     Temp Readings from Last 3 Encounters:   No data found for Temp     BP Readings from Last 3 Encounters:   No data found for BP     Pulse Readings from Last 3 Encounters:   No data found for Pulse         Mental Status Exam:    The patient was a bit more guarded today.  Less eye contact.  Her hands were folded on her chest and she was tapping her foot throughout the interview.  She however was cooperative.  She was a bit more vague and perhaps a bit less interested in talking.  Speech was soft spoken simple not pressured or rambling.  Answers were consistent and appropriate but limited effort.  Not with any neologisms.  Mood appeared somewhat depressed.  Affect was restricted.  Attention and concentration are adequate but she had limited effort.  Thought content did not show any obvious hallucinations or delusions.  No suicidal or homicidal ideation.  Thought formation does not show the patient to be loose.  No significant delays.  She seemed to be tracking fairly well.  As stated above however she had limited effort.  Fund of knowledge appeared baseline.    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    Other:   Return in a month's for a medication check.  She agrees to call before then with any concerns or questions.  Patient does not appear to be actively suicidal at this time.  She has chronic thoughts of wishing she was dead but these have recently been a bit less intense.  She is able to contract for safety.    Continue with the support of the clinic, reassurance, and redirection. Staff monitoring and ongoing assessments per team plan. Current psychotropic medication appears to represent the minimum effective dosage and appears medically necessary. We will continue to monitor and reassess. This team will utilize appropriate emergency services if necessary. I will make myself available if concerns or problems arise.    Danilo Hernández MD

## 2021-06-15 NOTE — PROGRESS NOTES
.  Outpatient Followup Psychiatric Evaluation      Pertinent History: Patient presents today for the purposes of medication management.  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  She had a possible seizure.  She required a  shunt.  She had cerebral edema with a 7 mm midline shift and a right parietal-occipital focal contusion.  She also had traumatic subarachnoid blood and a small subcortical parenchymal contusion in the left frontal bone.  In addition she had a frontal bone fracture anteriorly.  Please see the chart for full details.  The patient has been resistive to most medication therapies with no significant improvement.  Prior to the last visit she had been off Topamax for about 2 weeks with some decline in her mood.  At the last visit we had restarted the Topamax.  She had been involved in DBT groups which she reported was helpful.  We had been tapering the Lamictal that the patient had a decline in mood.  We have recently increased the Lamictal a couple of times.  Since the last visit we did discontinue her daytime Klonopin and she was complaining of daytime tiredness.  She has been attending DBT groups.    Current Symptoms:   Patient reports no significant change but tells me she is doing alright.  She states she did not need to discontinue daytime out of bed because she does not actually take that.  She takes Vistaril in the morning and then possibly as a as needed during the day but rarely does a as needed.  She reports that she has perhaps been a bit more anxious because she may need back surgery on an L5 fracture that has been there since the accident.  We did spend some time talking about that.  She states she still tired during the day.  She is unsure whether the Strattera has been helpful for her and she is wondering about trying to cut back on that.  We did spend some time talking about  the number of medications she is on in my hope that we can begin tapering some that may not a bit effective so we will go ahead and decrease the Strattera.  Patient reports that she has fleeting thoughts at times of wishing she was dead.  These are chronic and unchanged.  She is able to contract for safety and states she never has a plan or desire to hurt herself.  She denies psychosis.  No change in sleep, appetite or cognition.  She denies having any new medical concerns or side effects to the medications.        Current Medications: Please see chart. Medications personally reviewed.    Medication Compliance: yes    Side Effects to Medications:  No obvious side effects.      Vitals:  Wt Readings from Last 3 Encounters:   No data found for Wt     Temp Readings from Last 3 Encounters:   No data found for Temp     BP Readings from Last 3 Encounters:   No data found for BP     Pulse Readings from Last 3 Encounters:   No data found for Pulse         Mental Status Exam:    Patient again presents with somewhat of a blunted affect but she does participate better and initiates.  She does not appear to be as irritable or angry today.  No obvious pain or shortness of breath.  She continues with limited eye contact.  Speech is somewhat monotone but sentence structure is intact she is able to dialogue and content is appropriate.  Mood is perhaps a bit depressed.  Not anxious.  Less irritable.  No lability.  Thought content does not show any hallucinations or delusions.  No suicidal or homicidal ideation.  Thought formation does not show the patient to be loose.  No racing thoughts.  She is able to track and follow.  Insight, judgment and memory are all baseline and unchanged.  Fund of knowledge is baseline.    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  (She has a supply of 100 mg tablets at home)  and then she is going to decrease down to 80 mg a day.  We will continue with the other medications as ordered.    Other:   Return in 3 months for a medication check.  She agrees to call before then with any concerns or questions.  Patient does not appear to be actively suicidal.  She has fleeting thoughts which have been chronic for years.  She again is able to contract for safety and does not appear to be actively suicidal.    Continue with the support of the clinic, reassurance, and redirection. Staff monitoring and ongoing assessments per team plan. Current psychotropic medication appears to represent the minimum effective dosage and appears medically necessary. We will continue to monitor and reassess. This team will utilize appropriate emergency services if necessary. I will make myself available if concerns or problems arise.    Danilo Hernández MD

## 2021-06-16 NOTE — PROGRESS NOTES
.  Outpatient Followup Psychiatric Evaluation      Pertinent History: Patient presents today for the purposes of medication management.  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  She had a possible seizure.  She required a  shunt.  She had cerebral edema with a 7 mm midline shift and a right parietal-occipital focal contusion.  She also had traumatic subarachnoid blood and a small subcortical parenchymal contusion in the left frontal bone.  In addition she had a frontal bone fracture anteriorly.  Please see the chart for full details.  The patient has been resistive to most medication therapies with no significant improvement.  Prior to the last visit she had been off Topamax for about 2 weeks with some decline in her mood.  We recently restarted the Topamax.  She had been involved in DBT groups which she reported was helpful.  We had been tapering the Lamictal that the patient had a decline in mood.  We have recently increased the Lamictal a couple of times.  Prior to the last visit we did discontinue her daytime Klonopin and she was complaining of daytime tiredness.  At the last visit we began tapering her Strattera.  Patient had lumbar back surgery a month ago.  She is called a couple of times since the last visit stating anxiety is a bit worse in the covering.  From the surgery.  She is no longer on narcotic pain medication however.  She recently restarted DBT.    Current Symptoms:   Patient reports she is doing relatively well.  She began by stating that she hears a voice of a demon that says bad things about her.  With further questioning it was not actually a voice and she is unsure why she thought it was a demon but stated's that its her own thoughts reminding her how tough things are for her.  We talked about the possibility of medications to try and manage this but she stated it was manageable.   She was quite proud of the fact that she is off narcotics at this point.  She is frustrated by the point that she is not allowed to do any bending or lifting for a total of 3 months following the surgery.  She is 1 month into that and will be starting therapy but not for a while.  She states she has too much free time.  We did talk about trying to fill that free time with productive activities and she seemed to respond fairly well to that.    There is been no change in her chronic fleeting thoughts of wishing she was dead but she is not actively suicidal and again able to contract for safety.  She denies having any change in cognition.  She denies having any lena hallucinations or delusions.  Please see above it is unclear whether she did have some psychotic symptoms but with further questioning she backed off on that statement.  She reports she is not in any significant pain.  She denies having any side effects to the medication.  We did spend quite a bit of time talking about a variety of treatment options.  At this point I suggested we continue with the medications as ordered and consider a future taper again of the medications once she is through the recovery period following the surgery.  I did suggest that we could offer some as needed neuroleptic medication for if she were to be experiencing any voices but she declined that option stated she did not want to risk any weight gain.  She was able to contract for safety and told me she otherwise was doing well.        Current Medications: Please see chart. Medications personally reviewed.    Medication Compliance: yes    Side Effects to Medications:  No obvious side effects.      Vitals:  Wt Readings from Last 3 Encounters:   No data found for Wt     Temp Readings from Last 3 Encounters:   No data found for Temp     BP Readings from Last 3 Encounters:   No data found for BP     Pulse Readings from Last 3 Encounters:   No data found for Pulse         Mental Status  Exam:    Patient was alert and actually appeared a bit more calm and comfortable today.  She again had some periods where she was a bit dramatic but for the most part appeared relatively comfortable.  No obvious pain or shortness of breath.  Fairly good eye contact.  Able to initiate.  Sentence structure was intact.  Again somewhat monotone and slightly soft spoken but not pressured or rambling.  Answers were consistent.  Attention and concentration appeared adequate and she needed less redirection today.  Mood was possibly a bit depressed and slightly anxious.  No lability.  Thought content did not show any obvious psychosis.  No suicidal or homicidal ideation.  Thought formation does not show the patient to be loose.  Insight, judgment and memory all appear baseline and are unchanged.    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  I did suggest we consider a as needed of a neuroleptic medication but the patient has declined that.  The patient will continue with her DBT and follow up with her medical doctor and surgeon as scheduled.  She is looking forward to starting physical therapy.    Other:   Return in 3 months for a medication check.  She agrees to call before then with any concerns or questions.  Patient does not appear to be actively suicidal and again is able to contract for safety.    Continue with the support of the clinic, reassurance, and redirection. Staff monitoring and ongoing assessments per team plan. Current psychotropic medication appears to represent the minimum effective dosage and appears medically necessary. We will continue to monitor and reassess. This team will utilize appropriate  emergency services if necessary. I will make myself available if concerns or problems arise.    Danilo Hernández MD

## 2021-06-18 NOTE — PROGRESS NOTES
".  Outpatient Followup Psychiatric Evaluation      Pertinent History: Patient presents today for the purposes of medication management.  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  She had a possible seizure.  She required a  shunt.  She had cerebral edema with a 7 mm midline shift and a right parietal-occipital focal contusion.  She also had traumatic subarachnoid blood and a small subcortical parenchymal contusion in the left frontal bone.  In addition she had a frontal bone fracture anteriorly.  Please see the chart for full details.  The patient has been resistive to most medication therapies with no significant improvement.  Prior to the last visit she had been off Topamax for about 2 weeks with some decline in her mood.  We recently restarted the Topamax.  She had been involved in DBT groups which she reported was helpful.  We had been tapering the Lamictal that the patient had a decline in mood.  We have recently increased the Lamictal a couple of times.  We recently did taper her Klonopin.  We did adjust Strattera.  At the last visit we did not make any medication changes although I suggested considering a as needed neuroleptic.  The patient did call recently stating she feels at times she \"loses time\".  She apparently is continuing in DBT therapy.    Current Symptoms:   She reports no marked change but she has been more frustrated.  Due to her surgery she is not able to lift more than 10 pounds.  She is not able to work out or do sports and has gained 10 pounds.  She feels worse about herself.  She states she has her baseline voices and they comment on the fact that she is gained weight and \"nobody will want you\".  She reports she has her baseline thoughts of suicide but states she will not act on these.  She reports she continues with DBT.  Apparently the  has changed and that was " difficult for a time but things have been improving.  We did spend some time talking about the importance of that.    Patient reports that she slept 9 hours last night sleep is still variable.  We did discuss sleep hygiene.  She reports that on Thursday she will going for her three-month follow-up appointment with the neurosurgeon and hopefully at that point they will allow PT and increased activity which she thinks will have a positive effect on her energy level and sleep.  At this point she is willing to continue with the Strattera at the current dose.  She would like to continue with the other medications as ordered as she reports that she is hoping that as she improves her physical activity level that other things will improve.  She also recognizes the DBT is been helpful.    No change in cognition.  No change in appetite.  She denies having any other new medical concerns or complaints.  We again discussed the risks and benefits of the medication.  She has had no significant side effects.        Current Medications: Please see chart. Medications personally reviewed.    Medication Compliance: yes    Side Effects to Medications:  No obvious side effects.      Vitals:  Wt Readings from Last 3 Encounters:   No data found for Wt     Temp Readings from Last 3 Encounters:   No data found for Temp     BP Readings from Last 3 Encounters:   No data found for BP     Pulse Readings from Last 3 Encounters:   No data found for Pulse         Mental Status Exam:    Appearance:  Patient appears slow and flat.  No obvious shortness of breath. No obvious pain at this time.  Behavior: Slow.  Needing prompts to participate.  Not agitated. No restlessness.  No reports of any significant recent behavioral dyscontrol.  He is able to initiate and does participate well today.  She is polite and respectful.  Speech: Baseline monotone but able to dialogue and initiate.  Not pressured or rambling.  Answers are consistent and  appropriate.  Mood/Affect:  Flat, slow, depressed. No current anxiety or agitation. Not currently labile.  Thought Content:  No evidence of psychosis. No recent reported psychosis.  Suicidal or Homicidal Thoughts:  None apparent or reported.   Thought Process/Formulation:  Slow. [place]. No evidence of any racing thoughts.  Able to track and follow.  Associations: No obvious loosening of associations.  Slow. [place].  Fund of Knowledge:  Please see the therapy notes. No apparent recent change.  Attention/Concentration:  Flat slow.  Able to follow some simple conversation. No apparent recent change.  Insight: No apparent recent change.  Likely at baseline  Judgement: No apparent recent change.  Likely at baseline.  Memory:   Slow.  Seems adequate.  Motor Status:   No current tremor.  Orientation: No reports of any recent change.  Grossly oriented.    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  We will await word from the neurosurgeon regarding increasing the activity level.  The patient will continue with DBT.    Other:   Return in 3 months for a medication check.  She agrees to call before then with any concerns or questions.  Patient does not appear to be actively suicidal and again is able to contract for safety.    Continue with the support of the clinic, reassurance, and redirection. Staff monitoring and ongoing assessments per team plan. Current psychotropic medication appears to represent the minimum effective dosage and appears medically necessary. We will continue to monitor and reassess. This team will utilize appropriate emergency services if necessary. I will make myself available if concerns or problems arise.    Danilo Hernández MD

## 2021-06-19 NOTE — PROGRESS NOTES
Patient's impression of how medication is working? PT. Says she is doing good and wants to know if she can go up on one of her meds.    Compliant with Medication? none    Side Effects: None    Current Symptoms : No    Pain (0-10) Yes, a little over 10   Appetite change No  Sleep disturbance No  Change in energy Yes  Change in interest yes  Change in concentration No  Psychosis/Hallucinations No  Negative thoughts Yes  Mood swings Yes  Alcohol use Yes  Drug use No  Anxiety high  Sad/depressed mood moderate

## 2021-06-19 NOTE — PROGRESS NOTES
".  Outpatient Followup Psychiatric Evaluation      Pertinent History: Patient presents today for the purposes of medication management.  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  She had a possible seizure.  She required a  shunt.  She had cerebral edema with a 7 mm midline shift and a right parietal-occipital focal contusion.  She also had traumatic subarachnoid blood and a small subcortical parenchymal contusion in the left frontal bone.  In addition she had a frontal bone fracture anteriorly.  Please see the chart for full details.  The patient has been resistive to most medication therapies with no significant improvement.  In the past we had attempted to taper off Topamax but she had a decline in her mood and that was restarted.  She had been involved in DBT groups which she reported was helpful.  We had been tapering the Lamictal that the patient had a decline in mood.  Continue with Klonopin and Strattera recently.  She did report some periods of \"losing time\" but was vague on details.    Current Symptoms:   She reports she is doing relatively well.  She still reports that her moods have their ups and downs and she had a period of increased suicidal ideation about 2 weeks ago but she stated she was able to successfully utilize her DBT skills and those symptoms resolved.  She has not had any since.  She denies having any change in cognition.  No change in sleep or appetite but she is somewhat tired during the day.  When asked about her episodes of \"losing time\" she states that only happens if she is with a group of friends and she knows she is there but she feels like she is not really there.  She reports her physical therapy is finished and she is doing relatively well although she has some pain today due to some overuse issues yesterday.  She is working in the evenings but would like to perhaps " entertain a daytime job as she is apparently done with her DBT programming.  We discussed the importance of trying to find a job which was not too physically or emotionally demanding for her and she did agree with that.  No new medical issues and no side effects to the medication.  She believes the reintroduction of the Topamax has been helpful and she would like a further increase in that medication.  Risks and benefits were discussed.  We are going to increase her a.m. dose of Topamax to 50 mg.        Current Medications: Please see chart. Medications personally reviewed.    Medication Compliance: yes    Side Effects to Medications:  No obvious side effects.      Vitals:  Wt Readings from Last 3 Encounters:   No data found for Wt     Temp Readings from Last 3 Encounters:   No data found for Temp     BP Readings from Last 3 Encounters:   No data found for BP     Pulse Readings from Last 3 Encounters:   No data found for Pulse         Mental Status Exam:    Appearance: Patient is in no obvious distress.  No significant pain and no shortness of breath.  The patient however appears quite slow and flat.  She does have her arms folded on her chest throughout the interview and looks down at the floor quite frequently.  Behavior: Patient does participate but does not initiate much.  Slow.  Limited effort.  No reports of any recent significant behavioral difficulties.  Speech: Somewhat monotone but able to dialogue.  Able to initiate.  Not pressured or rambling.  Mood/Affect: Depressed.  Slow.  The patient appears quite flat and disinterested.  No anxiety.  No lability.  No yanely.  No irritability.  Thought Content:  No evidence of psychosis. No recent reported psychosis.  Suicidal or Homicidal Thoughts:  None apparent or reported.   Thought Process/Formulation: Needing prompts to participate.  Slow and concrete.  No racing thoughts.  The patient is able to follow some conversation.  Associations: Slow.  Not loose.  No  racing thoughts.  Fund of Knowledge: Able to participate a bit.  Somewhat limited effort however.  No apparent recent change.  Attention/Concentration: Tracking and following some simple conversation.  The patient is slow and flat.  No obvious recent change.  Insight: No apparent recent change.    Judgement: No apparent recent change.    Memory:   Slow.  A bit slow with limited participation.  Motor Status:   No current tremor.  No reports of any recent change.  Orientation: No reports of any recent change.  Grossly unchanged.    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 dose of Topamax to 50 mg.  Risks and benefits were discussed.    Other:   Return in 4 months for a medication check.  She agrees to call before then with any concerns or questions.  Patient appears to be improving and tolerating the current medications.    Continue with the support of the clinic, reassurance, and redirection. Staff monitoring and ongoing assessments per team plan. Current psychotropic medication appears to represent the minimum effective dosage and appears medically necessary. We will continue to monitor and reassess. This team will utilize appropriate emergency services if necessary. I will make myself available if concerns or problems arise.    Danilo Hernández MD

## 2021-06-22 NOTE — PROGRESS NOTES
Patient's impression of how medication is working? Pt. Says her medications are working well.    Compliant with Medication? none    Side Effects: None    Current Symptoms : No    Pain (0-10) No  Appetite change No  Sleep disturbance No  Change in energy No  Change in interest No  Change in concentration No  Psychosis/Hallucinations No  Negative thoughts Yes  Mood swings Yes  Alcohol use No  Drug use No  Anxiety high  Sad/depressed mood high

## 2021-06-22 NOTE — PROGRESS NOTES
.  Outpatient Followup Psychiatric Evaluation      Pertinent History: Patient presents today for the purposes of medication management.  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  She had a possible seizure.  She required a  shunt.  She had cerebral edema with a 7 mm midline shift and a right parietal-occipital focal contusion.  She also had traumatic subarachnoid blood and a small subcortical parenchymal contusion in the left frontal bone.  In addition she had a frontal bone fracture anteriorly.  Please see the chart for full details.  The patient has been resistive to most medication therapies with no significant improvement.  In the past we had attempted to taper off Topamax but she had a decline in her mood and that was restarted.  She had been involved in DBT groups which she reported was helpful.  We had been tapering the Lamictal in the spring 2018 and the patient had a decline in mood.     I saw the patient in August 2018 and at that time we did increase the Topamax slightly.  Since that visit, over the phone we did decrease the Topamax back down and increase Strattera.    Current Symptoms:   She presents today stating she is doing relatively well.  She states she continues with back pain and had to quit her job earlier this week due to increase pain associated with the workload there.  She graduated from DBT on December 20.  She states she has occasional episodes where she has verbal outbursts and will swear but she is not sure that that is significantly different.  She states she is spending time seeing her boyfriend and the holiday season has not been very stressful.  She did bring out her calendar to look up what else she has been up to.  She admits that because she is not working and not having DVT there is a lot more time off in January and she will see how that goes.    The patient reports  her chronic thoughts of wishing she was dead but she has no plan or desire to harm herself and she is able to contract for safety.  That actually has been better.  She is not having any psychosis.  No other new medical issues.  No side effects to the medication.  She reports she often wakes at night due to pain and has trouble falling back asleep and she is tired during the day.  She wondered about possibly further increasing the Strattera but I suggested we try and get the sleep better now that she is not having so much work related pain and see if that helps her daytime tiredness and she did agree.        Current Medications: Please see chart. Medications personally reviewed.    Medication Compliance: yes    Side Effects to Medications:  No obvious side effects.      Vitals:  Wt Readings from Last 3 Encounters:   No data found for Wt     Temp Readings from Last 3 Encounters:   No data found for Temp     BP Readings from Last 3 Encounters:   No data found for BP     Pulse Readings from Last 3 Encounters:   No data found for Pulse         Mental Status Exam:    Appearance:  Patient appears slow and flat.  No obvious shortness of breath. No obvious pain at this time.  No significant distress.  Behavior: Slow.  Needing prompts to participate.  Not agitated. No restlessness.  No reports of any significant recent behavioral dyscontrol.  She is cooperative.  She does tend to initiate and is able to dialogue.  She reports occasional verbal outbursts and swearing but she is and not at all irritable or frustrated during the visit.  Speech: Somewhat monotone.  Able to dialogue.  Not pressured or rambling.  Mood/Affect:  Flat, slow, somewhat depressed. No current anxiety or agitation. Not currently labile.  Affect is a bit restricted today.  No frustration or irritability.  Thought Content:  No evidence of psychosis. No recent reported psychosis.  Suicidal or Homicidal Thoughts:  None apparent or reported.   Thought  Process/Formulation:  Slow. [place]. No evidence of any racing thoughts.  Able to track and follow.  Associations: No obvious loosening of associations.  Slow. [place].  Fund of Knowledge:  Please see the therapy notes. No apparent recent change.  Attention/Concentration:  Flat slow.  Able to follow some simple conversation. No apparent recent change.  Insight: No apparent recent change.    Judgement: No apparent recent change.    Memory:   Slow.   Motor Status:   No current tremor.  Orientation: No reports of any recent change.     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    Other:   Return in 4 months for a medication check.  She agrees to call before then with any concerns or questions.  Patient appears to be improving and tolerating the current medications.    Continue with the support of the clinic, reassurance, and redirection. Staff monitoring and ongoing assessments per team plan. Current psychotropic medication appears to represent the minimum effective dosage and appears medically necessary. We will continue to monitor and reassess. This team will utilize appropriate emergency services if necessary. I will make myself available if concerns or problems arise.    Danilo Hernández MD

## 2021-07-03 NOTE — ADDENDUM NOTE
Addendum Note by Lisa Vickers CMA at 4/25/2019 11:59 PM     Author: Lisa Vickers CMA Service: -- Author Type: Certified Medical Assistant    Filed: 4/30/2019  2:22 PM Date of Service: 4/25/2019 11:59 PM Status: Signed    : Lisa Vickers CMA (Certified Medical Assistant)    Encounter addended by: Lisa Vickers CMA on: 4/30/2019  2:22 PM      Actions taken: Charge Capture section accepted

## 2021-07-03 NOTE — ADDENDUM NOTE
Addendum Note by Lisa Vickers CMA at 3/27/2018 12:42 PM     Author: Lisa Vickers CMA Service: -- Author Type: Certified Medical Assistant    Filed: 3/27/2018 12:42 PM Date of Service: 3/27/2018 12:42 PM Status: Signed    : Lisa Vickers CMA (Certified Medical Assistant)    Encounter addended by: Lisa Vickers CMA on: 3/27/2018 12:42 PM<BR>     Actions taken: Charge Capture section accepted

## 2021-07-03 NOTE — ADDENDUM NOTE
Addendum Note by Lisa Vickers CMA at 10/10/2017 10:53 AM     Author: Lisa Vickers CMA Service: -- Author Type: Certified Medical Assistant    Filed: 10/10/2017 10:53 AM Date of Service: 10/10/2017 10:53 AM Status: Signed    : Lisa Vickers CMA (Certified Medical Assistant)    Encounter addended by: Lisa Vickers CMA on: 10/10/2017 10:53 AM<BR>     Actions taken: Charge Capture section accepted

## 2021-07-03 NOTE — ADDENDUM NOTE
Addendum Note by Lisa Vickers CMA at 2/6/2018 10:41 AM     Author: Lisa iVckers CMA Service: -- Author Type: Certified Medical Assistant    Filed: 2/6/2018 10:41 AM Date of Service: 2/6/2018 10:41 AM Status: Signed    : Lisa Vickers CMA (Certified Medical Assistant)    Encounter addended by: Lisa Vickers CMA on: 2/6/2018 10:41 AM<BR>     Actions taken: Charge Capture section accepted              
Attending Attestation (For Attendings USE Only)...

## 2021-07-03 NOTE — ADDENDUM NOTE
Addendum Note by Sadia Parks SW at 9/6/2019 11:59 PM     Author: Sadia Parks SW Service: Neurology Author Type:     Filed: 9/9/2019 11:22 AM Date of Service: 9/6/2019 11:59 PM Status: Signed    : Sadia Parks SW ()    Encounter addended by: Sadia Parks SW on: 9/9/2019 11:22 AM      Actions taken: Sign clinical note

## 2021-07-03 NOTE — ADDENDUM NOTE
Addendum Note by Ana Polanco at 7/11/2017  3:50 PM     Author: Ana Polanco Service: -- Author Type: Medical Assistant    Filed: 7/11/2017  3:50 PM Date of Service: 7/11/2017  3:50 PM Status: Signed    : Ana Polanco    Encounter addended by: Ana Polanco MA on: 7/11/2017  3:50 PM<BR>     Actions taken: Charge Capture section accepted

## 2021-07-03 NOTE — ADDENDUM NOTE
Addendum Note by Lisa Vickers CMA at 11/16/2017 12:14 PM     Author: Lisa Vickers CMA Service: -- Author Type: Certified Medical Assistant    Filed: 11/16/2017 12:14 PM Date of Service: 11/16/2017 12:14 PM Status: Signed    : Lisa Vickers CMA (Certified Medical Assistant)    Encounter addended by: Lisa Vickers CMA on: 11/16/2017 12:14 PM<BR>     Actions taken: Charge Capture section accepted

## 2021-07-03 NOTE — ADDENDUM NOTE
Addendum Note by Lisa Vickers CMA at 8/14/2018 12:13 PM     Author: Lisa Vickers CMA Service: -- Author Type: Certified Medical Assistant    Filed: 8/14/2018 12:13 PM Date of Service: 8/14/2018 12:13 PM Status: Signed    : Lisa Vickers CMA (Certified Medical Assistant)    Encounter addended by: Lisa Vickers CMA on: 8/14/2018 12:13 PM<BR>     Actions taken: Charge Capture section accepted

## 2021-07-03 NOTE — ADDENDUM NOTE
Addendum Note by Shmuel Donald MA at 4/11/2017  3:39 PM     Author: Shmuel Donald MA Service: -- Author Type: Medical Assistant    Filed: 4/11/2017  3:39 PM Date of Service: 4/11/2017  3:39 PM Status: Signed    : Shmuel Donald MA (Medical Assistant)    Encounter addended by: Shmuel Donald MA on: 4/11/2017  3:39 PM<BR>     Actions taken: Charge Capture section accepted